# Patient Record
Sex: FEMALE | Race: WHITE | URBAN - NONMETROPOLITAN AREA
[De-identification: names, ages, dates, MRNs, and addresses within clinical notes are randomized per-mention and may not be internally consistent; named-entity substitution may affect disease eponyms.]

---

## 2016-12-05 LAB
CHOLESTEROL, TOTAL: 193 MG/DL
CHOLESTEROL/HDL RATIO: 3.7
HDLC SERPL-MCNC: 52 MG/DL (ref 35–70)
LDL CHOLESTEROL CALCULATED: 113.8 MG/DL (ref 0–160)
TRIGL SERPL-MCNC: 136 MG/DL
VLDLC SERPL CALC-MCNC: 27 MG/DL

## 2017-06-16 DIAGNOSIS — R73.01 IFG (IMPAIRED FASTING GLUCOSE): ICD-10-CM

## 2017-06-16 DIAGNOSIS — I67.1 CEREBRAL ANEURYSM: ICD-10-CM

## 2017-06-16 DIAGNOSIS — E78.49 OTHER HYPERLIPIDEMIA: ICD-10-CM

## 2017-06-16 DIAGNOSIS — I42.8 NON-ISCHEMIC CARDIOMYOPATHY (HCC): ICD-10-CM

## 2017-06-16 DIAGNOSIS — D12.6 BENIGN NEOPLASM OF COLON, UNSPECIFIED: ICD-10-CM

## 2017-06-16 PROBLEM — E78.5 HYPERLIPIDEMIA: Status: ACTIVE | Noted: 2017-06-16

## 2017-06-16 PROBLEM — H33.20 RETINAL DETACHMENT: Status: ACTIVE | Noted: 2017-06-16

## 2017-06-16 PROBLEM — K21.9 GERD (GASTROESOPHAGEAL REFLUX DISEASE): Status: ACTIVE | Noted: 2017-06-16

## 2017-06-16 PROBLEM — H69.90 EUSTACHIAN TUBE DISORDER: Status: ACTIVE | Noted: 2017-06-16

## 2017-06-16 RX ORDER — ATORVASTATIN CALCIUM 20 MG/1
20 TABLET, FILM COATED ORAL DAILY
COMMUNITY
End: 2017-06-22 | Stop reason: SDUPTHER

## 2017-06-16 RX ORDER — ASPIRIN 325 MG
325 TABLET, DELAYED RELEASE (ENTERIC COATED) ORAL DAILY
COMMUNITY

## 2017-06-16 RX ORDER — GINKGO BILOBA 60 MG
TABLET ORAL
COMMUNITY
End: 2020-06-05

## 2017-06-16 RX ORDER — FLUTICASONE PROPIONATE 50 MCG
1 SPRAY, SUSPENSION (ML) NASAL DAILY
COMMUNITY
End: 2018-07-11 | Stop reason: SDUPTHER

## 2017-06-16 RX ORDER — CETIRIZINE HYDROCHLORIDE 10 MG/1
10 TABLET ORAL DAILY
COMMUNITY
End: 2019-10-14

## 2017-06-16 RX ORDER — M-VIT,TX,IRON,MINS/CALC/FOLIC 27MG-0.4MG
1 TABLET ORAL DAILY
COMMUNITY

## 2017-06-16 RX ORDER — PANTOPRAZOLE SODIUM 40 MG/1
40 TABLET, DELAYED RELEASE ORAL DAILY
COMMUNITY
End: 2017-06-22 | Stop reason: SDUPTHER

## 2017-06-22 ENCOUNTER — OFFICE VISIT (OUTPATIENT)
Dept: FAMILY MEDICINE CLINIC | Age: 62
End: 2017-06-22
Payer: COMMERCIAL

## 2017-06-22 VITALS
HEIGHT: 68 IN | BODY MASS INDEX: 25.61 KG/M2 | WEIGHT: 169 LBS | HEART RATE: 62 BPM | DIASTOLIC BLOOD PRESSURE: 80 MMHG | SYSTOLIC BLOOD PRESSURE: 130 MMHG

## 2017-06-22 DIAGNOSIS — E78.2 MIXED HYPERLIPIDEMIA: ICD-10-CM

## 2017-06-22 DIAGNOSIS — Z23 NEED FOR PROPHYLACTIC VACCINATION AND INOCULATION AGAINST VARICELLA: ICD-10-CM

## 2017-06-22 DIAGNOSIS — F41.8 DEPRESSION WITH ANXIETY: ICD-10-CM

## 2017-06-22 DIAGNOSIS — K21.9 GASTROESOPHAGEAL REFLUX DISEASE WITHOUT ESOPHAGITIS: ICD-10-CM

## 2017-06-22 DIAGNOSIS — Z11.59 NEED FOR HEPATITIS C SCREENING TEST: Primary | ICD-10-CM

## 2017-06-22 DIAGNOSIS — R73.01 IFG (IMPAIRED FASTING GLUCOSE): ICD-10-CM

## 2017-06-22 PROCEDURE — 99214 OFFICE O/P EST MOD 30 MIN: CPT | Performed by: FAMILY MEDICINE

## 2017-06-22 RX ORDER — ESCITALOPRAM OXALATE 10 MG/1
10 TABLET ORAL DAILY
Qty: 30 TABLET | Refills: 5 | Status: SHIPPED | OUTPATIENT
Start: 2017-06-22 | End: 2017-12-08 | Stop reason: SDUPTHER

## 2017-06-22 RX ORDER — ATORVASTATIN CALCIUM 20 MG/1
20 TABLET, FILM COATED ORAL DAILY
Qty: 30 TABLET | Refills: 5 | Status: SHIPPED | OUTPATIENT
Start: 2017-06-22 | End: 2018-01-09 | Stop reason: SDUPTHER

## 2017-06-22 RX ORDER — PANTOPRAZOLE SODIUM 40 MG/1
40 TABLET, DELAYED RELEASE ORAL DAILY
Qty: 30 TABLET | Refills: 5 | Status: SHIPPED | OUTPATIENT
Start: 2017-06-22 | End: 2018-01-09 | Stop reason: SDUPTHER

## 2017-06-22 ASSESSMENT — ENCOUNTER SYMPTOMS
WHEEZING: 0
COUGH: 0
DOUBLE VISION: 0
BLURRED VISION: 0
HEARTBURN: 0
SHORTNESS OF BREATH: 0
ABDOMINAL PAIN: 0

## 2017-06-22 ASSESSMENT — PATIENT HEALTH QUESTIONNAIRE - PHQ9
SUM OF ALL RESPONSES TO PHQ9 QUESTIONS 1 & 2: 2
2. FEELING DOWN, DEPRESSED OR HOPELESS: 1
1. LITTLE INTEREST OR PLEASURE IN DOING THINGS: 1
SUM OF ALL RESPONSES TO PHQ QUESTIONS 1-9: 2

## 2017-08-24 ENCOUNTER — OFFICE VISIT (OUTPATIENT)
Dept: FAMILY MEDICINE CLINIC | Age: 62
End: 2017-08-24
Payer: COMMERCIAL

## 2017-08-24 VITALS
SYSTOLIC BLOOD PRESSURE: 118 MMHG | BODY MASS INDEX: 26.23 KG/M2 | HEART RATE: 72 BPM | DIASTOLIC BLOOD PRESSURE: 60 MMHG | WEIGHT: 172.5 LBS

## 2017-08-24 DIAGNOSIS — F41.8 DEPRESSION WITH ANXIETY: Primary | ICD-10-CM

## 2017-08-24 PROCEDURE — 99214 OFFICE O/P EST MOD 30 MIN: CPT | Performed by: FAMILY MEDICINE

## 2017-12-08 DIAGNOSIS — F41.8 DEPRESSION WITH ANXIETY: ICD-10-CM

## 2017-12-10 RX ORDER — ESCITALOPRAM OXALATE 10 MG/1
10 TABLET ORAL DAILY
Qty: 30 TABLET | Refills: 5 | Status: SHIPPED | OUTPATIENT
Start: 2017-12-10 | End: 2018-03-12 | Stop reason: SDUPTHER

## 2018-01-02 LAB
AGE FOR GFR: 61
ALT SERPL-CCNC: 48 UNITS/L
ANION GAP SERPL CALCULATED.3IONS-SCNC: 16 MMOL/L
AST SERPL-CCNC: 26 UNITS/L
BUN BLDV-MCNC: 17 MG/DL
CALCIUM SERPL-MCNC: 9.4 MG/DL
CHLORIDE BLD-SCNC: 104 MMOL/L
CHOLESTEROL/HDL RATIO: 3.6 RATIO
CHOLESTEROL: 204 MG/DL
CO2: 28 MMOL/L
CREAT SERPL-MCNC: 0.8 MG/DL
EGFR BF: 88 ML/MIN/1.73 M2
EGFR BM: 119 ML/MIN/1.73 M2
EGFR WF: 73 ML/MIN/1.73 M2
EGFR WM: 98 ML/MIN/1.73 M2
GLUCOSE: 101 MG/DL
HDL, DIRECT: 56 MG/DL
HEPATITIS C IGG: NORMAL
LDL CHOLESTEROL CALCULATED: 130.2 MG/DL
POTASSIUM SERPL-SCNC: 4.5 MMOL/L
SIGNAL/CUTOFF: NORMAL
SODIUM BLD-SCNC: 143 MMOL/L
TRIGL SERPL-MCNC: 89 MG/DL
VLDLC SERPL CALC-MCNC: 18 MG/DL

## 2018-01-09 LAB — GLUCOSE FASTING: 101 MG/DL

## 2018-01-09 NOTE — TELEPHONE ENCOUNTER
Kev Milligan is calling to request a refill on the following medication(s):  Requested Prescriptions     Pending Prescriptions Disp Refills    atorvastatin (LIPITOR) 20 MG tablet [Pharmacy Med Name: ATORVASTATIN 20 MG TABLET] 30 tablet 5     Sig: take 1 tablet by mouth once daily    pantoprazole (PROTONIX) 40 MG tablet [Pharmacy Med Name: PANTOPRAZOLE SOD DR 40 MG TAB] 30 tablet 5     Sig: take 1 tablet by mouth once daily       Last Visit Date (If Applicable):  5/71/7091    Next Visit Date:    1/10/2018

## 2018-01-10 ENCOUNTER — OFFICE VISIT (OUTPATIENT)
Dept: FAMILY MEDICINE CLINIC | Age: 63
End: 2018-01-10
Payer: COMMERCIAL

## 2018-01-10 VITALS
DIASTOLIC BLOOD PRESSURE: 80 MMHG | WEIGHT: 187 LBS | BODY MASS INDEX: 28.43 KG/M2 | SYSTOLIC BLOOD PRESSURE: 120 MMHG | HEART RATE: 68 BPM

## 2018-01-10 DIAGNOSIS — K21.9 GASTROESOPHAGEAL REFLUX DISEASE WITHOUT ESOPHAGITIS: ICD-10-CM

## 2018-01-10 DIAGNOSIS — Z86.010 HISTORY OF COLON POLYPS: ICD-10-CM

## 2018-01-10 DIAGNOSIS — E78.2 MIXED HYPERLIPIDEMIA: ICD-10-CM

## 2018-01-10 DIAGNOSIS — F41.8 DEPRESSION WITH ANXIETY: Primary | ICD-10-CM

## 2018-01-10 PROCEDURE — 99214 OFFICE O/P EST MOD 30 MIN: CPT | Performed by: FAMILY MEDICINE

## 2018-01-10 RX ORDER — ATORVASTATIN CALCIUM 20 MG/1
TABLET, FILM COATED ORAL
Qty: 30 TABLET | Refills: 5 | Status: SHIPPED | OUTPATIENT
Start: 2018-01-10 | End: 2018-03-12 | Stop reason: SDUPTHER

## 2018-01-10 RX ORDER — PANTOPRAZOLE SODIUM 40 MG/1
TABLET, DELAYED RELEASE ORAL
Qty: 30 TABLET | Refills: 5 | Status: SHIPPED | OUTPATIENT
Start: 2018-01-10 | End: 2018-03-12 | Stop reason: SDUPTHER

## 2018-01-10 ASSESSMENT — ENCOUNTER SYMPTOMS
SHORTNESS OF BREATH: 0
HEARTBURN: 0

## 2018-01-10 NOTE — PROGRESS NOTES
Completed    Pneumococcal med risk  Completed    Hepatitis C screen  Completed       Subjective:      Review of Systems   Respiratory: Negative for shortness of breath. Cardiovascular: Negative for chest pain. Musculoskeletal: Negative for myalgias. Neurological: Negative for focal weakness. Constitutional: Negative for malaise/fatigue. Respiratory: Negative for shortness of breath. Cardiovascular: Negative for chest pain. Gastrointestinal: Negative for heartburn. Musculoskeletal: Negative for myalgias. Psychiatric/Behavioral: Negative for depression (mood has been ok). The patient is not nervous/anxious and does not have insomnia. Objective:     /80   Pulse 68   Wt 187 lb (84.8 kg)   BMI 28.43 kg/m²     Physical Exam   Constitutional: She is oriented to person, place, and time. She appears well-developed and well-nourished. HENT:   Head: Normocephalic and atraumatic. Eyes: Conjunctivae and EOM are normal.   Neck: Normal range of motion. Neck supple. No JVD present. No thyromegaly present. Cardiovascular: Normal rate, regular rhythm and intact distal pulses. Exam reveals no gallop and no friction rub. No murmur heard. Pulmonary/Chest: Effort normal and breath sounds normal. No respiratory distress. Abdominal: Soft. Musculoskeletal: She exhibits no edema. Lymphadenopathy:     She has no cervical adenopathy. Neurological: She is alert and oriented to person, place, and time. Skin: Skin is warm. Psychiatric: She has a normal mood and affect. Her behavior is normal. Judgment and thought content normal.   Nursing note and vitals reviewed. Assessment/Plan:     1. Depression with anxiety  Well cotnrolled. Continue on the current medications   2. History of colon polyps  Due for the colonoscopy- schedule this summer   3. Mixed hyperlipidemia  Reviewed diet deniz with the glucose of 101. No change in meds at this time   4.  Gastroesophageal reflux disease without

## 2018-03-12 DIAGNOSIS — F41.8 DEPRESSION WITH ANXIETY: ICD-10-CM

## 2018-03-12 RX ORDER — ESCITALOPRAM OXALATE 10 MG/1
10 TABLET ORAL DAILY
Qty: 90 TABLET | Refills: 3 | Status: SHIPPED | OUTPATIENT
Start: 2018-03-12 | End: 2019-01-09 | Stop reason: SDUPTHER

## 2018-03-12 RX ORDER — PANTOPRAZOLE SODIUM 40 MG/1
40 TABLET, DELAYED RELEASE ORAL DAILY
Qty: 90 TABLET | Refills: 3 | Status: SHIPPED | OUTPATIENT
Start: 2018-03-12 | End: 2018-07-11 | Stop reason: SDUPTHER

## 2018-03-12 RX ORDER — ATORVASTATIN CALCIUM 20 MG/1
20 TABLET, FILM COATED ORAL DAILY
Qty: 90 TABLET | Refills: 3 | Status: SHIPPED | OUTPATIENT
Start: 2018-03-12 | End: 2018-07-11 | Stop reason: SDUPTHER

## 2018-03-12 NOTE — TELEPHONE ENCOUNTER
Requesting a 3 month supply instead of a 1 month     Monique Lucero is calling to request a refill on the following medication(s):  Requested Prescriptions     Pending Prescriptions Disp Refills    escitalopram (LEXAPRO) 10 MG tablet 90 tablet 3     Sig: Take 1 tablet by mouth daily    atorvastatin (LIPITOR) 20 MG tablet 90 tablet 3     Sig: Take 1 tablet by mouth daily    pantoprazole (PROTONIX) 40 MG tablet 90 tablet 3     Sig: Take 1 tablet by mouth daily       Last Visit Date (If Applicable):  3/17/9634    Next Visit Date:    7/11/2018

## 2018-07-11 ENCOUNTER — OFFICE VISIT (OUTPATIENT)
Dept: FAMILY MEDICINE CLINIC | Age: 63
End: 2018-07-11
Payer: COMMERCIAL

## 2018-07-11 VITALS
BODY MASS INDEX: 27.07 KG/M2 | HEART RATE: 60 BPM | WEIGHT: 178.06 LBS | DIASTOLIC BLOOD PRESSURE: 78 MMHG | SYSTOLIC BLOOD PRESSURE: 124 MMHG | OXYGEN SATURATION: 95 %

## 2018-07-11 DIAGNOSIS — J30.89 CHRONIC NONSEASONAL ALLERGIC RHINITIS DUE TO POLLEN: ICD-10-CM

## 2018-07-11 DIAGNOSIS — F33.42 RECURRENT MAJOR DEPRESSIVE DISORDER, IN FULL REMISSION (HCC): ICD-10-CM

## 2018-07-11 DIAGNOSIS — Z12.39 SCREENING BREAST EXAMINATION: ICD-10-CM

## 2018-07-11 DIAGNOSIS — Z12.11 ENCOUNTER FOR SCREENING COLONOSCOPY: ICD-10-CM

## 2018-07-11 DIAGNOSIS — E78.2 MIXED HYPERLIPIDEMIA: Primary | ICD-10-CM

## 2018-07-11 DIAGNOSIS — K21.9 GASTROESOPHAGEAL REFLUX DISEASE WITHOUT ESOPHAGITIS: ICD-10-CM

## 2018-07-11 DIAGNOSIS — R73.01 IMPAIRED FASTING GLUCOSE: ICD-10-CM

## 2018-07-11 PROCEDURE — 99214 OFFICE O/P EST MOD 30 MIN: CPT | Performed by: FAMILY MEDICINE

## 2018-07-11 RX ORDER — ATORVASTATIN CALCIUM 20 MG/1
20 TABLET, FILM COATED ORAL DAILY
Qty: 90 TABLET | Refills: 3 | Status: SHIPPED | OUTPATIENT
Start: 2018-07-11 | End: 2019-03-13

## 2018-07-11 RX ORDER — FLUTICASONE PROPIONATE 50 MCG
1 SPRAY, SUSPENSION (ML) NASAL DAILY
Qty: 3 BOTTLE | Refills: 3 | Status: SHIPPED | OUTPATIENT
Start: 2018-07-11 | End: 2019-10-14 | Stop reason: SDUPTHER

## 2018-07-11 RX ORDER — PANTOPRAZOLE SODIUM 40 MG/1
40 TABLET, DELAYED RELEASE ORAL DAILY
Qty: 90 TABLET | Refills: 3 | Status: SHIPPED | OUTPATIENT
Start: 2018-07-11 | End: 2018-07-11 | Stop reason: SDUPTHER

## 2018-07-11 ASSESSMENT — ENCOUNTER SYMPTOMS
BLOOD IN STOOL: 0
WHEEZING: 0
ABDOMINAL PAIN: 0
COUGH: 0
SHORTNESS OF BREATH: 0
CONSTIPATION: 0
DIARRHEA: 0

## 2018-07-11 ASSESSMENT — PATIENT HEALTH QUESTIONNAIRE - PHQ9
SUM OF ALL RESPONSES TO PHQ QUESTIONS 1-9: 0
1. LITTLE INTEREST OR PLEASURE IN DOING THINGS: 0
SUM OF ALL RESPONSES TO PHQ9 QUESTIONS 1 & 2: 0
2. FEELING DOWN, DEPRESSED OR HOPELESS: 0

## 2018-07-11 NOTE — PROGRESS NOTES
Review of Systems   Respiratory: Negative for cough, shortness of breath and wheezing. Cardiovascular: Negative for chest pain, palpitations and leg swelling. Gastrointestinal: Negative for abdominal pain, constipation and diarrhea. Genitourinary: Negative for frequency and urgency. Musculoskeletal: Negative for joint pain and myalgias. Neurological: Negative for dizziness and headaches. Psychiatric/Behavioral: Negative for depression. The patient is not nervous/anxious. Insurance will only cover protonix 3 months out of the year. Is there something else that I could be using.

## 2018-07-11 NOTE — PROGRESS NOTES
Laterality Date    COLONOSCOPY  08/2008    mynor many small polyps due 2018    SKIN CANCER EXCISION  04/2014    TONSILLECTOMY         Family History   Problem Relation Age of Onset    High Blood Pressure Mother     Heart Disease Mother     Diabetes Mother     Heart Surgery Father     Heart Disease Father        Social History   Substance Use Topics    Smoking status: Former Smoker    Smokeless tobacco: Never Used    Alcohol use Yes      Current Outpatient Prescriptions   Medication Sig Dispense Refill    fluticasone (FLONASE) 50 MCG/ACT nasal spray 1 spray by Nasal route daily 3 Bottle 3    atorvastatin (LIPITOR) 20 MG tablet Take 1 tablet by mouth daily 90 tablet 3    pantoprazole (PROTONIX) 40 MG tablet Take 1 tablet by mouth daily 90 tablet 3    escitalopram (LEXAPRO) 10 MG tablet Take 1 tablet by mouth daily 90 tablet 3    Cyanocobalamin (VITAMIN B 12 PO) Take by mouth      Multiple Vitamins-Minerals (THERAPEUTIC MULTIVITAMIN-MINERALS) tablet Take 1 tablet by mouth daily      Ginkgo 60 MG TABS Take by mouth      cetirizine (ZYRTEC) 10 MG tablet Take 10 mg by mouth daily      Calcium Carbonate-Vitamin D (CALCIUM-VITAMIN D) 500-200 MG-UNIT per tablet Take 1 tablet by mouth 2 times daily (with meals)      aspirin 325 MG EC tablet Take 325 mg by mouth every 6 hours as needed for Pain       No current facility-administered medications for this visit.       Allergies   Allergen Reactions    Bupropion Other (See Comments)     irritable    Chantix [Varenicline]      Irritable         Health Maintenance   Topic Date Due    A1C test (Diabetic or Prediabetic)  02/05/1966    HIV screen  02/05/1971    Shingles Vaccine (1 of 2 - 2 Dose Series) 02/05/2006    DTaP/Tdap/Td vaccine (2 - Tdap) 08/07/2018    Colon cancer screen colonoscopy  08/18/2018    Flu vaccine (1) 09/01/2018    Breast cancer screen  03/01/2019    Cervical cancer screen  12/23/2019    Lipid screen  01/02/2023    Pneumococcal med risk  Completed    Hepatitis C screen  Completed       Subjective:      Review of Systems   Respiratory: Negative for cough and shortness of breath. Cardiovascular: Negative for chest pain and leg swelling. Gastrointestinal: Negative for abdominal pain, blood in stool, constipation and diarrhea. Musculoskeletal: Negative for myalgias. Neurological: Negative for focal weakness. Respiratory: Negative for cough, shortness of breath and wheezing. Cardiovascular: Negative for chest pain, palpitations and leg swelling. Gastrointestinal: Negative for abdominal pain, constipation and diarrhea. Genitourinary: Negative for frequency and urgency. Musculoskeletal: Negative for joint pain and myalgias. Neurological: Negative for dizziness and headaches. Psychiatric/Behavioral: Negative for depression. The patient is not nervous/anxious. Objective:     /78   Pulse 60   Wt 178 lb 1 oz (80.8 kg)   SpO2 95%   BMI 27.07 kg/m²     Physical Exam   Constitutional: She is oriented to person, place, and time. She appears well-developed and well-nourished. HENT:   Head: Normocephalic and atraumatic. Eyes: Conjunctivae and EOM are normal.   Neck: Normal range of motion. Neck supple. No JVD present. No thyromegaly present. Cardiovascular: Normal rate, regular rhythm and intact distal pulses. Exam reveals no gallop and no friction rub. No murmur heard. Pulmonary/Chest: Effort normal and breath sounds normal. No respiratory distress. Abdominal: Soft. She exhibits no distension and no mass. There is no tenderness. There is no rebound and no guarding. Musculoskeletal: She exhibits no edema. Lymphadenopathy:     She has no cervical adenopathy. Neurological: She is alert and oriented to person, place, and time. Skin: Skin is warm. Psychiatric: She has a normal mood and affect. Her behavior is normal. Judgment normal.   Nursing note and vitals reviewed.       Assessment/Plan:

## 2018-07-12 RX ORDER — PANTOPRAZOLE SODIUM 40 MG/1
40 TABLET, DELAYED RELEASE ORAL DAILY
Qty: 90 TABLET | Refills: 3 | Status: SHIPPED | OUTPATIENT
Start: 2018-07-12 | End: 2019-01-09 | Stop reason: SDUPTHER

## 2018-08-01 LAB — SURGICAL PATHOLOGY REPORT: NORMAL

## 2018-12-17 LAB
AGE FOR GFR: 62
ALT SERPL-CCNC: 32 UNITS/L
ANION GAP SERPL CALCULATED.3IONS-SCNC: 11 MMOL/L
AST SERPL-CCNC: 27 UNITS/L
BUN BLDV-MCNC: 18 MG/DL
CALCIUM SERPL-MCNC: 9.4 MG/DL
CHLORIDE BLD-SCNC: 106 MMOL/L
CHOLESTEROL/HDL RATIO: 4.8 RATIO
CHOLESTEROL: 232 MG/DL
CO2: 29 MMOL/L
CREAT SERPL-MCNC: 0.9 MG/DL
EGFR BF: 77 ML/MIN/1.73 M2
EGFR BM: 104 ML/MIN/1.73 M2
EGFR WF: 63 ML/MIN/1.73 M2
EGFR WM: 86 ML/MIN/1.73 M2
GLUCOSE: 104 MG/DL
HDL, DIRECT: 48 MG/DL
LDL CHOLESTEROL CALCULATED: 165.8 MG/DL
POTASSIUM SERPL-SCNC: 4.5 MMOL/L
SODIUM BLD-SCNC: 141 MMOL/L
TRIGL SERPL-MCNC: 91 MG/DL
VLDLC SERPL CALC-MCNC: 18 MG/DL

## 2019-01-09 ENCOUNTER — OFFICE VISIT (OUTPATIENT)
Dept: FAMILY MEDICINE CLINIC | Age: 64
End: 2019-01-09
Payer: COMMERCIAL

## 2019-01-09 VITALS
OXYGEN SATURATION: 94 % | DIASTOLIC BLOOD PRESSURE: 74 MMHG | SYSTOLIC BLOOD PRESSURE: 126 MMHG | HEART RATE: 68 BPM | BODY MASS INDEX: 28.02 KG/M2 | TEMPERATURE: 98 F | WEIGHT: 184.31 LBS

## 2019-01-09 DIAGNOSIS — Z23 NEED FOR INFLUENZA VACCINATION: ICD-10-CM

## 2019-01-09 DIAGNOSIS — I42.8 NON-ISCHEMIC CARDIOMYOPATHY (HCC): ICD-10-CM

## 2019-01-09 DIAGNOSIS — F41.8 DEPRESSION WITH ANXIETY: ICD-10-CM

## 2019-01-09 DIAGNOSIS — R63.5 WEIGHT GAIN: ICD-10-CM

## 2019-01-09 DIAGNOSIS — F33.42 RECURRENT MAJOR DEPRESSIVE DISORDER, IN FULL REMISSION (HCC): ICD-10-CM

## 2019-01-09 DIAGNOSIS — E78.2 MIXED HYPERLIPIDEMIA: ICD-10-CM

## 2019-01-09 DIAGNOSIS — Z83.49 FAMILY HISTORY OF THYROID DISEASE IN MOTHER: ICD-10-CM

## 2019-01-09 DIAGNOSIS — R73.01 IFG (IMPAIRED FASTING GLUCOSE): ICD-10-CM

## 2019-01-09 DIAGNOSIS — K21.9 GASTROESOPHAGEAL REFLUX DISEASE WITHOUT ESOPHAGITIS: ICD-10-CM

## 2019-01-09 LAB — TSH REFLEX FT4: 2.48 MIU/ML

## 2019-01-09 PROCEDURE — 90471 IMMUNIZATION ADMIN: CPT | Performed by: FAMILY MEDICINE

## 2019-01-09 PROCEDURE — 99214 OFFICE O/P EST MOD 30 MIN: CPT | Performed by: FAMILY MEDICINE

## 2019-01-09 PROCEDURE — 90686 IIV4 VACC NO PRSV 0.5 ML IM: CPT | Performed by: FAMILY MEDICINE

## 2019-01-09 RX ORDER — PANTOPRAZOLE SODIUM 40 MG/1
40 TABLET, DELAYED RELEASE ORAL DAILY
Qty: 90 TABLET | Refills: 3 | Status: SHIPPED | OUTPATIENT
Start: 2019-01-09 | End: 2020-03-04

## 2019-01-09 RX ORDER — ESCITALOPRAM OXALATE 10 MG/1
10 TABLET ORAL DAILY
Qty: 90 TABLET | Refills: 3 | Status: SHIPPED | OUTPATIENT
Start: 2019-01-09 | End: 2020-03-03

## 2019-01-09 ASSESSMENT — ENCOUNTER SYMPTOMS
COUGH: 0
DIARRHEA: 0
SHORTNESS OF BREATH: 0
WHEEZING: 0
ABDOMINAL PAIN: 0
CONSTIPATION: 0

## 2019-03-13 RX ORDER — ATORVASTATIN CALCIUM 20 MG/1
TABLET, FILM COATED ORAL
Qty: 90 TABLET | Refills: 3 | Status: SHIPPED | OUTPATIENT
Start: 2019-03-13 | End: 2020-03-03

## 2019-07-08 ENCOUNTER — TELEPHONE (OUTPATIENT)
Dept: FAMILY MEDICINE CLINIC | Age: 64
End: 2019-07-08

## 2019-07-08 ENCOUNTER — OFFICE VISIT (OUTPATIENT)
Dept: FAMILY MEDICINE CLINIC | Age: 64
End: 2019-07-08
Payer: COMMERCIAL

## 2019-07-08 VITALS
DIASTOLIC BLOOD PRESSURE: 84 MMHG | HEART RATE: 60 BPM | WEIGHT: 181.4 LBS | BODY MASS INDEX: 27.58 KG/M2 | SYSTOLIC BLOOD PRESSURE: 128 MMHG | OXYGEN SATURATION: 95 %

## 2019-07-08 DIAGNOSIS — Z83.49 FAMILY HISTORY OF THYROID DISEASE IN MOTHER: ICD-10-CM

## 2019-07-08 DIAGNOSIS — Z12.39 BREAST SCREENING, UNSPECIFIED: ICD-10-CM

## 2019-07-08 DIAGNOSIS — I10 ESSENTIAL HYPERTENSION: Primary | ICD-10-CM

## 2019-07-08 DIAGNOSIS — Z12.39 SCREENING BREAST EXAMINATION: ICD-10-CM

## 2019-07-08 DIAGNOSIS — Z86.79 HISTORY OF NONTRAUMATIC RUPTURE OF CEREBRAL ANEURYSM: Primary | ICD-10-CM

## 2019-07-08 DIAGNOSIS — Z86.79 HISTORY OF NONTRAUMATIC RUPTURE OF CEREBRAL ANEURYSM: ICD-10-CM

## 2019-07-08 DIAGNOSIS — E78.2 MIXED HYPERLIPIDEMIA: ICD-10-CM

## 2019-07-08 DIAGNOSIS — F33.42 RECURRENT MAJOR DEPRESSIVE DISORDER, IN FULL REMISSION (HCC): ICD-10-CM

## 2019-07-08 DIAGNOSIS — F41.8 DEPRESSION WITH ANXIETY: ICD-10-CM

## 2019-07-08 PROCEDURE — 99214 OFFICE O/P EST MOD 30 MIN: CPT | Performed by: FAMILY MEDICINE

## 2019-07-08 RX ORDER — AMLODIPINE BESYLATE 2.5 MG/1
2.5 TABLET ORAL DAILY
Qty: 90 TABLET | Refills: 1 | Status: SHIPPED | OUTPATIENT
Start: 2019-07-08 | End: 2019-12-08 | Stop reason: SDUPTHER

## 2019-07-08 ASSESSMENT — ENCOUNTER SYMPTOMS
COUGH: 0
DIARRHEA: 0
ABDOMINAL PAIN: 0
SHORTNESS OF BREATH: 0
WHEEZING: 0
CONSTIPATION: 0

## 2019-07-08 NOTE — PROGRESS NOTES
Review of Systems   Respiratory: Negative for cough, shortness of breath and wheezing. Cardiovascular: Negative for chest pain, palpitations and leg swelling. Gastrointestinal: Negative for abdominal pain, constipation and diarrhea. Genitourinary: Negative for frequency and urgency. Neurological: Negative for dizziness and headaches. States my BP has been high. Jumping all over. Denies any symptoms when BP is elevated.
aneurysms we will go ahead and start with the low-dose of amlodipine 2.5 mg daily if blood pressure not well controlled would bump that up to 5 mg daily pressures at home range anywhere from the 443J to 783 systolic   2. Family history of thyroid disease in mother     1. Recurrent major depressive disorder, in full remission [facility])   doing well on her current medication. No change at this time. 4. Depression with anxiety     5. History of nontraumatic rupture of cerebral aneurysm  MRI BRAIN W CONTRAST-the rapid fluctuations in the blood pressure without clear etiology and just not feeling right will go ahead and check the MRI in light of her history of multiple aneurysms and potential for recurrence    MRA HEAD W CONTRAST   6. Breast screening, unspecified     7. Screening breast examination  JENNIFER DIGITAL SCREEN W CAD BILATERAL   8. Mixed hyperlipidemia   well-controlled at last visit continue on her current medicines asymptomatic recheck in December         Lab Results   Component Value Date    CHOL 232 (H) 12/17/2018    TRIG 91 12/17/2018    HDL 52 12/05/2016    ALT 32 12/17/2018    AST 27 12/17/2018     12/17/2018    K 4.5 12/17/2018     12/17/2018    CREATININE 0.9 12/17/2018    BUN 18 (H) 12/17/2018    CO2 29 12/17/2018    GLUF 101 01/09/2018       Return in about 2 months (around 9/8/2019) for HTN, Medication recheck. Patient given educational materials - see patientinstructions. Discussed use, benefit, and side effects of prescribed medications. All patient questions answered. Pt voiced understanding. Reviewed health maintenance. Instructed to continue current medications, diet andexercise. Patient agreed with treatment plan. Follow up as directed.      Electronically signed by Chelsea Ford MD on 7/9/2019

## 2019-07-29 LAB
AGE FOR GFR: 63
ANION GAP SERPL CALCULATED.3IONS-SCNC: 11 MMOL/L
BASOPHILS # BLD: 0.07 THOU/MM3 (ref 0–0.3)
BUN BLDV-MCNC: 28 MG/DL (ref 7–17)
CALCIUM SERPL-MCNC: 9.8 MG/DL (ref 8.4–10.2)
CHLORIDE BLD-SCNC: 103 MMOL/L (ref 98–120)
CO2: 28 MMOL/L (ref 22–31)
CREAT SERPL-MCNC: 0.8 MG/DL (ref 0.5–1)
DIFFERENTIAL: AUTOMATED DIFF
EGFR BF: 88 ML/MIN/1.73 M2
EGFR BM: 118 ML/MIN/1.73 M2
EGFR WF: 72 ML/MIN/1.73 M2
EGFR WM: 98 ML/MIN/1.73 M2
EOSINOPHIL # BLD: 0.15 THOU/MM3 (ref 0–1.1)
GLUCOSE: 95 MG/DL (ref 65–105)
HCT VFR BLD CALC: 38.2 % (ref 37–47)
HEMOGLOBIN: 12.8 G/DL (ref 12–16)
LYMPHOCYTES # BLD: 1.63 THOU/MM3 (ref 1–5.5)
MCH RBC QN AUTO: 31.5 PG (ref 28.5–32)
MCHC RBC AUTO-ENTMCNC: 33.4 G/DL (ref 32–37)
MCV RBC AUTO: 94.1 FL (ref 80–94)
MONOCYTES # BLD: 0.43 THOU/MM3 (ref 0.1–1)
NEUTROPHILS: 3.15 THOU/MM3 (ref 2–8.1)
PDW BLD-RTO: 10.9 % (ref 8.5–15.5)
PLATELET # BLD: 235 THOU/MM3 (ref 130–400)
PMV BLD AUTO: 6.5 FL (ref 7.4–11)
POTASSIUM SERPL-SCNC: 4.2 MMOL/L (ref 3.6–5)
RBC # BLD: 4.06 M/UL (ref 4.2–5.4)
SODIUM BLD-SCNC: 138 MMOL/L (ref 135–145)
WBC # BLD: 5.42 THOU/ML3 (ref 4.8–10)

## 2019-08-09 ENCOUNTER — TELEPHONE (OUTPATIENT)
Dept: FAMILY MEDICINE CLINIC | Age: 64
End: 2019-08-09

## 2019-08-09 DIAGNOSIS — Z86.79 HISTORY OF NONTRAUMATIC RUPTURE OF CEREBRAL ANEURYSM: Primary | ICD-10-CM

## 2019-08-10 NOTE — TELEPHONE ENCOUNTER
I am not sure either. Best option may be to return to neurology for an evaluation to see if they recommend observation vs. Angiogram vs a CTA.   Does she want to go back to [facility] or Dr. Avel Liu at [facility]. V's who is an interventional radiologist?

## 2019-08-14 NOTE — TELEPHONE ENCOUNTER
Spoke with Karan Gallego her at [facility] stated that Dr. Reginald Mason did not want to do it (CTA) here due to the reading being do in depth, advised that the type of coil/stent that was placed be found out because most of them are MRA safe plus it would just benefit her to know for further health management    Could be done at [facility] or even [facility]

## 2019-08-14 NOTE — TELEPHONE ENCOUNTER
Please call Timo Martinez and see if we can get the information-- let them know what we are needing to know.

## 2019-10-14 ENCOUNTER — OFFICE VISIT (OUTPATIENT)
Dept: FAMILY MEDICINE CLINIC | Age: 64
End: 2019-10-14
Payer: COMMERCIAL

## 2019-10-14 VITALS
BODY MASS INDEX: 28.74 KG/M2 | WEIGHT: 189 LBS | HEART RATE: 76 BPM | OXYGEN SATURATION: 97 % | SYSTOLIC BLOOD PRESSURE: 128 MMHG | DIASTOLIC BLOOD PRESSURE: 82 MMHG

## 2019-10-14 DIAGNOSIS — E78.2 MIXED HYPERLIPIDEMIA: ICD-10-CM

## 2019-10-14 DIAGNOSIS — I10 ESSENTIAL HYPERTENSION: ICD-10-CM

## 2019-10-14 DIAGNOSIS — J30.89 CHRONIC NONSEASONAL ALLERGIC RHINITIS DUE TO POLLEN: ICD-10-CM

## 2019-10-14 DIAGNOSIS — Z23 NEED FOR INFLUENZA VACCINATION: ICD-10-CM

## 2019-10-14 DIAGNOSIS — Z86.79 HISTORY OF NONTRAUMATIC RUPTURE OF CEREBRAL ANEURYSM: ICD-10-CM

## 2019-10-14 PROCEDURE — 90686 IIV4 VACC NO PRSV 0.5 ML IM: CPT | Performed by: FAMILY MEDICINE

## 2019-10-14 PROCEDURE — 90471 IMMUNIZATION ADMIN: CPT | Performed by: FAMILY MEDICINE

## 2019-10-14 PROCEDURE — 99214 OFFICE O/P EST MOD 30 MIN: CPT | Performed by: FAMILY MEDICINE

## 2019-10-14 RX ORDER — FLUTICASONE PROPIONATE 50 MCG
1 SPRAY, SUSPENSION (ML) NASAL DAILY
Qty: 3 BOTTLE | Refills: 3 | Status: SHIPPED | OUTPATIENT
Start: 2019-10-14

## 2019-10-14 RX ORDER — TETRACYCLINE HCL 500 MG
CAPSULE ORAL
COMMUNITY
End: 2021-06-14

## 2019-10-14 RX ORDER — CHLORHEXIDINE GLUCONATE 0.12 MG/ML
RINSE ORAL
Refills: 0 | COMMUNITY
Start: 2019-07-16 | End: 2020-06-05

## 2019-10-14 ASSESSMENT — ENCOUNTER SYMPTOMS
SHORTNESS OF BREATH: 0
CONSTIPATION: 0
WHEEZING: 0
ABDOMINAL PAIN: 0
COUGH: 0
DIARRHEA: 0

## 2019-12-08 DIAGNOSIS — I10 ESSENTIAL HYPERTENSION: ICD-10-CM

## 2019-12-09 RX ORDER — AMLODIPINE BESYLATE 2.5 MG/1
TABLET ORAL
Qty: 90 TABLET | Refills: 1 | Status: SHIPPED | OUTPATIENT
Start: 2019-12-09 | End: 2020-07-17

## 2020-03-03 RX ORDER — ATORVASTATIN CALCIUM 20 MG/1
TABLET, FILM COATED ORAL
Qty: 90 TABLET | Refills: 3 | Status: SHIPPED | OUTPATIENT
Start: 2020-03-03 | End: 2021-02-22 | Stop reason: SDUPTHER

## 2020-03-03 RX ORDER — ESCITALOPRAM OXALATE 10 MG/1
TABLET ORAL
Qty: 90 TABLET | Refills: 3 | Status: SHIPPED | OUTPATIENT
Start: 2020-03-03 | End: 2021-02-22 | Stop reason: SDUPTHER

## 2020-03-04 RX ORDER — PANTOPRAZOLE SODIUM 40 MG/1
TABLET, DELAYED RELEASE ORAL
Qty: 90 TABLET | Refills: 3 | Status: SHIPPED | OUTPATIENT
Start: 2020-03-04 | End: 2021-03-01 | Stop reason: SDUPTHER

## 2020-06-03 LAB
ANION GAP SERPL CALCULATED.3IONS-SCNC: 6.4 MMOL/L
BUN BLDV-MCNC: 17 MG/DL (ref 7–17)
CALCIUM SERPL-MCNC: 9.4 MG/DL (ref 8.4–10.2)
CHLORIDE BLD-SCNC: 106 MMOL/L (ref 98–120)
CHOLESTEROL/HDL RATIO: 3.67 RATIO (ref 0–4.5)
CHOLESTEROL: 213 MG/DL (ref 50–200)
CO2: 26 MMOL/L (ref 22–31)
CREAT SERPL-MCNC: 0.8 MG/DL (ref 0.5–1)
GFR CALCULATED: > 60
GLUCOSE: 114 MG/DL (ref 65–105)
HDLC SERPL-MCNC: 58 MG/DL (ref 36–68)
LDL CHOLESTEROL CALCULATED: 108.8 MG/DL (ref 0–160)
POTASSIUM SERPL-SCNC: 3.9 MMOL/L (ref 3.6–5)
SODIUM BLD-SCNC: 139 MMOL/L (ref 135–145)
TRIGL SERPL-MCNC: 231 MG/DL (ref 10–250)
VLDLC SERPL CALC-MCNC: 46 MG/DL (ref 0–40)

## 2020-06-05 ENCOUNTER — OFFICE VISIT (OUTPATIENT)
Dept: FAMILY MEDICINE CLINIC | Age: 65
End: 2020-06-05
Payer: COMMERCIAL

## 2020-06-05 VITALS
OXYGEN SATURATION: 96 % | BODY MASS INDEX: 28.49 KG/M2 | HEART RATE: 59 BPM | WEIGHT: 188 LBS | SYSTOLIC BLOOD PRESSURE: 112 MMHG | DIASTOLIC BLOOD PRESSURE: 68 MMHG | HEIGHT: 68 IN

## 2020-06-05 LAB — HBA1C MFR BLD: 6.2 %

## 2020-06-05 PROCEDURE — 99214 OFFICE O/P EST MOD 30 MIN: CPT | Performed by: FAMILY MEDICINE

## 2020-06-05 PROCEDURE — 83036 HEMOGLOBIN GLYCOSYLATED A1C: CPT | Performed by: FAMILY MEDICINE

## 2020-06-05 PROCEDURE — 90471 IMMUNIZATION ADMIN: CPT | Performed by: FAMILY MEDICINE

## 2020-06-05 PROCEDURE — 90715 TDAP VACCINE 7 YRS/> IM: CPT | Performed by: FAMILY MEDICINE

## 2020-06-05 RX ORDER — VITAMIN B COMPLEX
1 CAPSULE ORAL DAILY
COMMUNITY
End: 2021-12-13

## 2020-06-05 NOTE — PROGRESS NOTES
Social History     Tobacco Use    Smoking status: Former Smoker    Smokeless tobacco: Never Used   Substance Use Topics    Alcohol use: Yes      Current Outpatient Medications   Medication Sig Dispense Refill    b complex vitamins capsule Take 1 capsule by mouth daily      Omega-3 Fatty Acids (OMEGA 3 PO) Take by mouth      pantoprazole (PROTONIX) 40 MG tablet take 1 tablet by mouth once daily 90 tablet 3    atorvastatin (LIPITOR) 20 MG tablet take 1 tablet by mouth once daily 90 tablet 3    escitalopram (LEXAPRO) 10 MG tablet take 1 tablet by mouth once daily 90 tablet 3    amLODIPine (NORVASC) 2.5 MG tablet take 1 tablet by mouth once daily 90 tablet 1    Apple Cider Vinegar 500 MG TABS Take by mouth      RA DAILY GARLIC PO Take by mouth      fluticasone (FLONASE) 50 MCG/ACT nasal spray 1 spray by Nasal route daily 3 Bottle 3    Multiple Vitamins-Minerals (THERAPEUTIC MULTIVITAMIN-MINERALS) tablet Take 1 tablet by mouth daily      Calcium Carbonate-Vitamin D (CALCIUM-VITAMIN D) 500-200 MG-UNIT per tablet Take 1 tablet by mouth 2 times daily (with meals)      aspirin 325 MG EC tablet Take 325 mg by mouth daily        No current facility-administered medications for this visit.       Allergies   Allergen Reactions    Bupropion Other (See Comments)     irritable    Chantix [Varenicline]      Irritable         Health Maintenance   Topic Date Due    HIV screen  02/05/1971    Shingles Vaccine (2 of 3) 12/09/2017    Cervical cancer screen  12/23/2019    Lipid screen  06/03/2021    A1C test (Diabetic or Prediabetic)  06/05/2021    Breast cancer screen  08/06/2021    Colon cancer screen colonoscopy  07/30/2028    DTaP/Tdap/Td vaccine (3 - Td) 06/05/2030    Flu vaccine  Completed    Pneumococcal 0-64 years Vaccine  Completed    Hepatitis C screen  Completed    Hepatitis A vaccine  Aged Out    Hepatitis B vaccine  Aged Out    Hib vaccine  Aged Out    Meningococcal (ACWY) vaccine  Aged Out

## 2020-06-07 ASSESSMENT — ENCOUNTER SYMPTOMS
ORTHOPNEA: 0
SHORTNESS OF BREATH: 0
BLURRED VISION: 0

## 2020-07-15 NOTE — TELEPHONE ENCOUNTER
Jose Quijano is requesting a refill on the following medication(s):  Requested Prescriptions     Pending Prescriptions Disp Refills    amLODIPine (NORVASC) 2.5 MG tablet [Pharmacy Med Name: AMLODIPINE BESYLATE 2.5 MG TAB] 90 tablet 1     Sig: take 1 tablet by mouth once daily       Last Visit Date (If Applicable):  1/6/3638    Next Visit Date:    12/10/2020

## 2020-07-17 RX ORDER — AMLODIPINE BESYLATE 2.5 MG/1
TABLET ORAL
Qty: 90 TABLET | Refills: 1 | Status: SHIPPED | OUTPATIENT
Start: 2020-07-17 | End: 2020-12-10 | Stop reason: SDUPTHER

## 2020-12-10 ENCOUNTER — OFFICE VISIT (OUTPATIENT)
Dept: FAMILY MEDICINE CLINIC | Age: 65
End: 2020-12-10
Payer: COMMERCIAL

## 2020-12-10 ENCOUNTER — HOSPITAL ENCOUNTER (OUTPATIENT)
Age: 65
Setting detail: SPECIMEN
Discharge: HOME | End: 2020-12-10
Payer: COMMERCIAL

## 2020-12-10 VITALS
BODY MASS INDEX: 27.98 KG/M2 | SYSTOLIC BLOOD PRESSURE: 122 MMHG | DIASTOLIC BLOOD PRESSURE: 82 MMHG | HEART RATE: 72 BPM | OXYGEN SATURATION: 96 % | WEIGHT: 184 LBS

## 2020-12-10 LAB
ABSOLUTE EOS #: 0.14 K/UL (ref 0–0.44)
ABSOLUTE IMMATURE GRANULOCYTE: <0.03 K/UL (ref 0–0.3)
ABSOLUTE LYMPH #: 1.38 K/UL (ref 1.1–3.7)
ABSOLUTE MONO #: 0.37 K/UL (ref 0.1–1.2)
BASOPHILS # BLD: 1 % (ref 0–2)
BASOPHILS ABSOLUTE: 0.05 K/UL (ref 0–0.2)
DIFFERENTIAL TYPE: ABNORMAL
EOSINOPHILS RELATIVE PERCENT: 3 % (ref 1–4)
HCT VFR BLD CALC: 38.8 % (ref 36.3–47.1)
HEMOGLOBIN: 12.8 G/DL (ref 11.9–15.1)
IMMATURE GRANULOCYTES: 0 %
LYMPHOCYTES # BLD: 29 % (ref 24–43)
MCH RBC QN AUTO: 31.9 PG (ref 25.2–33.5)
MCHC RBC AUTO-ENTMCNC: 33 G/DL (ref 25.2–33.5)
MCV RBC AUTO: 96.8 FL (ref 82.6–102.9)
MONOCYTES # BLD: 8 % (ref 3–12)
NRBC AUTOMATED: 0 PER 100 WBC
PDW BLD-RTO: 11.7 % (ref 11.8–14.4)
PLATELET # BLD: 325 K/UL (ref 138–453)
PLATELET ESTIMATE: ABNORMAL
PMV BLD AUTO: 9.1 FL (ref 8.1–13.5)
RBC # BLD: 4.01 M/UL (ref 3.95–5.11)
RBC # BLD: ABNORMAL 10*6/UL
SEG NEUTROPHILS: 59 % (ref 36–65)
SEGMENTED NEUTROPHILS ABSOLUTE COUNT: 2.86 K/UL (ref 1.5–8.1)
WBC # BLD: 4.8 K/UL (ref 3.5–11.3)
WBC # BLD: ABNORMAL 10*3/UL

## 2020-12-10 PROCEDURE — 99214 OFFICE O/P EST MOD 30 MIN: CPT | Performed by: FAMILY MEDICINE

## 2020-12-10 PROCEDURE — 90471 IMMUNIZATION ADMIN: CPT | Performed by: FAMILY MEDICINE

## 2020-12-10 PROCEDURE — 85025 COMPLETE CBC W/AUTO DIFF WBC: CPT

## 2020-12-10 PROCEDURE — 90686 IIV4 VACC NO PRSV 0.5 ML IM: CPT | Performed by: FAMILY MEDICINE

## 2020-12-10 PROCEDURE — 36415 COLL VENOUS BLD VENIPUNCTURE: CPT

## 2020-12-10 RX ORDER — AMLODIPINE BESYLATE 2.5 MG/1
TABLET ORAL
Qty: 90 TABLET | Refills: 1 | Status: SHIPPED | OUTPATIENT
Start: 2020-12-10 | End: 2021-06-14

## 2020-12-10 ASSESSMENT — ENCOUNTER SYMPTOMS
ORTHOPNEA: 0
SHORTNESS OF BREATH: 0
BLURRED VISION: 0

## 2020-12-10 ASSESSMENT — PATIENT HEALTH QUESTIONNAIRE - PHQ9
SUM OF ALL RESPONSES TO PHQ QUESTIONS 1-9: 0
2. FEELING DOWN, DEPRESSED OR HOPELESS: 0
SUM OF ALL RESPONSES TO PHQ QUESTIONS 1-9: 0
1. LITTLE INTEREST OR PLEASURE IN DOING THINGS: 0
SUM OF ALL RESPONSES TO PHQ9 QUESTIONS 1 & 2: 0
SUM OF ALL RESPONSES TO PHQ QUESTIONS 1-9: 0

## 2020-12-10 NOTE — PATIENT INSTRUCTIONS
The Covid 19 flu shots that are seeking FDA approval at this time for emergency use are very promising. The effectiveness is around 95% which is wonderful. They are both to dose vaccines, 3 to 4 weeks apart for both doses. Safety and effectiveness appears to be very equal between the 2. The difference for patient's will likely end up being availability due to the storage requirements of these vaccines. Both vaccines may not be available in all locations. At this time it appears either vaccine will be appropriate to receive. I feel very comfortable with the safety of these vaccines for myself, my patients, and my family. The technology behind these vaccines has been in development for a number of years and in fact SARS research has been going on at the [facility] for a decade just not related to the COVID-19 virus. The companies that have successfully developed these vaccines have used the technology that was already in development and applied it to the need at hand. The current plan appears to be to open vaccination to healthcare providers nursing homes and nursing home residents first followed by high risk patients such as the elderly and those with health conditions, followed by the general population. I am hopeful that we will be able to start receiving immunizations in this community at the end of December 2020. Side effects of the Covid vaccine are thought to be mild and may include injection site redness or soreness, general fatigue and achiness with perhaps even a low-grade fever for 24 to 48 hours as the immune system gets ready to respond. These can be managed very effectively with over-the-counter Tylenol or ibuprofen as needed for symptom control. It is also important to remember that a vaccine that is 95% effective is wonderful but that means that it is 5% ineffective. The importance of wearing a mask, social distancing and frequent handwashing will not go away for some time to come. We will need to continue to practice these measures to protect ourselves and her loved ones from COVID-19 infection. I would certainly encourage you to receive the vaccine when it is available.

## 2020-12-10 NOTE — PROGRESS NOTES
[address] [facility]  [address]  Dept: [phone]  Dept [facility]:[phone]    Sean Barber is a 60 y.o. female who presents today for her medical conditions/complaints as notedbelow. Sean Barber is c/o of Gastroesophageal Reflux (6 month f/u)      HPI:     Gastroesophageal Reflux   She reports no chest pain. Hypertension   This is a chronic problem. The current episode started more than 1 year ago. The problem is unchanged. The problem is controlled. Pertinent negatives include no anxiety, blurred vision, chest pain, headaches, malaise/fatigue, neck pain, orthopnea, palpitations, peripheral edema, PND, shortness of breath or sweats. There are no associated agents to hypertension. Risk factors for coronary artery disease include dyslipidemia, family history, post-menopausal state and smoking/tobacco exposure (Remain smoke-free). Past treatments include calcium channel blockers. The current treatment provides significant improvement. There are no compliance problems. Has been feeling good overall. Mood is OK. She does have some stress worrying about her sister who is not doing quite as well. She is sleeping fine. She is staying active. She has not had any down or depressed symptoms. She has been trying to watch her weight trying to lose little bit of weight in light of her history of impaired fasting glucose. Has been taking the cholesterol meds regularly. No side effects. Denies of chest pain shortness of breath palpitations no lower extremity edema. No cardiac signs or symptoms. She has a history of smoking and remain smoke-free at this time.  Diabetes Sister        Social History     Tobacco Use    Smoking status: Former Smoker    Smokeless tobacco: Never Used   Substance Use Topics    Alcohol use: Yes      Current Outpatient Medications   Medication Sig Dispense Refill    amLODIPine (NORVASC) 2.5 MG tablet take 1 tablet by mouth once daily 90 tablet 1    b complex vitamins capsule Take 1 capsule by mouth daily      Omega-3 Fatty Acids (OMEGA 3 PO) Take by mouth      pantoprazole (PROTONIX) 40 MG tablet take 1 tablet by mouth once daily 90 tablet 3    atorvastatin (LIPITOR) 20 MG tablet take 1 tablet by mouth once daily 90 tablet 3    escitalopram (LEXAPRO) 10 MG tablet take 1 tablet by mouth once daily 90 tablet 3    Apple Cider Vinegar 500 MG TABS Take by mouth      RA DAILY GARLIC PO Take by mouth      fluticasone (FLONASE) 50 MCG/ACT nasal spray 1 spray by Nasal route daily 3 Bottle 3    Multiple Vitamins-Minerals (THERAPEUTIC MULTIVITAMIN-MINERALS) tablet Take 1 tablet by mouth daily      Calcium Carbonate-Vitamin D (CALCIUM-VITAMIN D) 500-200 MG-UNIT per tablet Take 1 tablet by mouth 2 times daily (with meals)      aspirin 325 MG EC tablet Take 325 mg by mouth daily        No current facility-administered medications for this visit.       Allergies   Allergen Reactions    Bupropion Other (See Comments)     irritable    Chantix [Varenicline]      Irritable         Health Maintenance   Topic Date Due    HIV screen  02/05/1971    Shingles Vaccine (2 of 3) 12/09/2017    Cervical cancer screen  12/23/2019    Lipid screen  06/03/2021    A1C test (Diabetic or Prediabetic)  06/05/2021    Breast cancer screen  08/06/2021    Colon cancer screen colonoscopy  07/30/2028    DTaP/Tdap/Td vaccine (3 - Td) 06/05/2030    Flu vaccine  Completed    Pneumococcal 0-64 years Vaccine  Completed    Hepatitis C screen  Completed    Hepatitis A vaccine  Aged Out    Hepatitis B vaccine  Aged Out    Hib vaccine  Aged Out  Meningococcal (ACWY) vaccine  Aged Out       Subjective:      Review of Systems   Constitutional: Negative for malaise/fatigue. Eyes: Negative for blurred vision. Respiratory: Negative for shortness of breath. Cardiovascular: Negative for chest pain, palpitations, orthopnea and PND. Musculoskeletal: Negative for neck pain. Neurological: Negative for headaches. Objective:     /82 (Site: Left Upper Arm, Position: Sitting, Cuff Size: Medium Adult)   Pulse 72   Wt 184 lb (83.5 kg)   SpO2 96%   BMI 27.98 kg/m²     Physical Exam  Vitals signs and nursing note reviewed. Constitutional:       Appearance: Normal appearance. She is well-developed. HENT:      Head: Normocephalic and atraumatic. Eyes:      Conjunctiva/sclera: Conjunctivae normal.   Neck:      Musculoskeletal: Normal range of motion and neck supple. Thyroid: No thyromegaly. Vascular: No JVD. Cardiovascular:      Rate and Rhythm: Normal rate and regular rhythm. Heart sounds: Normal heart sounds. No murmur. No friction rub. No gallop. Pulmonary:      Effort: Pulmonary effort is normal. No respiratory distress. Breath sounds: Normal breath sounds. Abdominal:      Palpations: Abdomen is soft. Lymphadenopathy:      Cervical: No cervical adenopathy. Skin:     General: Skin is warm. Capillary Refill: Capillary refill takes less than 2 seconds. Neurological:      Mental Status: She is alert and oriented to person, place, and time. Assessment/Plan:      Diagnosis Orders   1. Depression with anxiety     2. Rectal bleeding  [facility], DO, General Surgery, [place]    CBC Auto Differential   3. Needs flu shot  INFLUENZA, QUADV, 3 YRS AND OLDER, IM PF, PREFILL SYR OR SDV, 0.5ML (AFLURIA QUADV, PF)   4. Benign neoplasm of colon, unspecified part of colon     5.  History of colon polyps  [facility], DO, General Surgery, [place] 6. Essential hypertension  amLODIPine (NORVASC) 2.5 MG tablet    Comprehensive Metabolic Panel   7. Impaired fasting blood sugar  Comprehensive Metabolic Panel   8. Mixed hyperlipidemia  Lipid Panel    Comprehensive Metabolic Panel   9. Gastroesophageal reflux disease without esophagitis       In light of the is episode of pain and rectal bleeding she certainly needs reevaluation for a possible colonoscopy. We will check a CBC today to make sure that her hemoglobin is stable. Advised if a recurrence of pain that does not resolve quickly or recurrence of persistent bleeding she is to call or go to the emergency room immediately. Hypertension and hyperlipidemia are both well controlled at this time. No changes in her current medications. Encouraged her diet and exercise for risk factor modification. Recheck labs in 6 months including an A1c at that time. Continue on her pantoprazole at this time. Continue dietary modifications to prevent GERD symptoms as well. Doing well on her current SSRI dose. Encouraged her to continue to stay in close contact with her sister. Lab Results   Component Value Date    WBC 4.8 12/10/2020    HGB 12.8 12/10/2020    HCT 38.8 12/10/2020     12/10/2020    CHOL 213 (H) 06/03/2020    TRIG 231 06/03/2020    HDL 58 06/03/2020    ALT 32 12/17/2018    AST 27 12/17/2018     06/03/2020    K 3.9 06/03/2020     06/03/2020    CREATININE 0.8 06/03/2020    BUN 17 06/03/2020    CO2 26 06/03/2020    GLUF 101 01/09/2018    LABA1C 6.2 06/05/2020       Return in about 6 months (around 6/10/2021) for Pap/ well woman. Patient given educational materials - see patientinstructions. Discussed use, benefit, and side effects of prescribed medications. All patient questions answered. Pt voiced understanding. Reviewed health maintenance. Instructed to continue current medications, diet andexercise. Patient agreed with treatment plan. Follow up as directed. (Please note that portions of this note were completed with a voice-recognition program. Efforts were made to edit the dictation but occasionally words are mis-transcribed.)    Electronically signed by Ranjan Mcpherson MD on 12/13/2020

## 2020-12-22 ENCOUNTER — OFFICE VISIT (OUTPATIENT)
Dept: SURGERY | Age: 65
End: 2020-12-22
Payer: COMMERCIAL

## 2020-12-22 VITALS
TEMPERATURE: 98.9 F | SYSTOLIC BLOOD PRESSURE: 97 MMHG | BODY MASS INDEX: 27.59 KG/M2 | WEIGHT: 186.3 LBS | HEART RATE: 62 BPM | DIASTOLIC BLOOD PRESSURE: 77 MMHG | HEIGHT: 69 IN

## 2020-12-22 PROCEDURE — 99213 OFFICE O/P EST LOW 20 MIN: CPT | Performed by: SURGERY

## 2020-12-22 SDOH — HEALTH STABILITY: MENTAL HEALTH: HOW OFTEN DO YOU HAVE A DRINK CONTAINING ALCOHOL?: 2-4 TIMES A MONTH

## 2020-12-22 SDOH — HEALTH STABILITY: MENTAL HEALTH: HOW MANY STANDARD DRINKS CONTAINING ALCOHOL DO YOU HAVE ON A TYPICAL DAY?: 1 OR 2

## 2020-12-22 NOTE — PROGRESS NOTES
Sheri Saeed is a 60 y.o. female who presents today for evaluation of recent rectal bleeding and lower abdominal pain. Patient reports approximately 4 weeks ago she had a bowel movement and when she finished and looked at the toilet water was full of blood. She denies any pain with her bowel movement at that time and was not having any abdominal pain. About a week later she had 1 to 2 days of lower abdominal pain that she states was crampy in nature and was in bilateral lower quadrants. During that time she is not having bowel movements but was continuing to pass flatus. She states that day after the pain started she did have a bowel movement which was mucousy and when she had the bowel movement the pain resolved. Since that time has had no further abdominal pains and no bleeding. Because of the bleeding she was seen by her PCP and was referred to general surgery for further evaluation. Patient denies any other changes in bowel movements and states that typically her bowel movements are soft and formed. Has not noticed any caliber or color change. Denies any unintended weight loss. No family history of inflammatory bowel diseases reported. Does have family history of colon cancer in a second-degree relative but no other family members. Patient has had colonoscopies in the past.  Her first 1 was in 2008 and apparently had multiple polyps removed. Her second colonoscopy was actually in July 2018 and she was noted to have 1 hyperplastic polyp in addition to diverticulosis and internal hemorrhoids. She presents today for evaluation of rectal bleeding.     Past Medical History:   Diagnosis Date    Depression with anxiety 8/24/2017    History of colon polyps     benign    Hyperlipidemia     Recurrent major depressive disorder, in full remission ([facility] [address] 7/11/2018       Past Surgical History:   Procedure Laterality Date    COLONOSCOPY  08/2008    mynor many small polyps due 2018  SKIN CANCER EXCISION  04/2014    TONSILLECTOMY         Current Outpatient Medications   Medication Sig Dispense Refill    amLODIPine (NORVASC) 2.5 MG tablet take 1 tablet by mouth once daily 90 tablet 1    Omega-3 Fatty Acids (OMEGA 3 PO) Take by mouth      pantoprazole (PROTONIX) 40 MG tablet take 1 tablet by mouth once daily 90 tablet 3    atorvastatin (LIPITOR) 20 MG tablet take 1 tablet by mouth once daily 90 tablet 3    escitalopram (LEXAPRO) 10 MG tablet take 1 tablet by mouth once daily 90 tablet 3    fluticasone (FLONASE) 50 MCG/ACT nasal spray 1 spray by Nasal route daily 3 Bottle 3    Multiple Vitamins-Minerals (THERAPEUTIC MULTIVITAMIN-MINERALS) tablet Take 1 tablet by mouth daily      Calcium Carbonate-Vitamin D (CALCIUM-VITAMIN D) 500-200 MG-UNIT per tablet Take 1 tablet by mouth 2 times daily (with meals)      aspirin 325 MG EC tablet Take 325 mg by mouth daily       b complex vitamins capsule Take 1 capsule by mouth daily      Apple Cider Vinegar 500 MG TABS Take by mouth      RA DAILY GARLIC PO Take by mouth       No current facility-administered medications for this visit.         Allergies   Allergen Reactions    Bupropion Other (See Comments)     irritable    Chantix [Varenicline]      Irritable         Family History   Problem Relation Age of Onset    High Blood Pressure Mother     Heart Disease Mother     Diabetes Mother     Thyroid Disease Mother     Heart Surgery Father     Heart Disease Father     Thyroid Disease Sister     Diabetes Sister     Diabetes Brother        Social History     Socioeconomic History    Marital status:      Spouse name: Not on file    Number of children: Not on file    Years of education: Not on file    Highest education level: Not on file   Occupational History    Not on file   Social Needs    Financial resource strain: Not on file    Food insecurity     Worry: Not on file     Inability: Not on file   [employer] needs Medical: Not on file     Non-medical: Not on file   Tobacco Use    Smoking status: Former Smoker     Packs/day: 2.00     Types: Cigarettes    Smokeless tobacco: Never Used   Substance and Sexual Activity    Alcohol use: Yes     Frequency: 2-4 times a month     Drinks per session: 1 or 2     Binge frequency: Less than monthly    Drug use: No    Sexual activity: Not on file   Lifestyle    Physical activity     Days per week: Not on file     Minutes per session: Not on file    Stress: Not on file   Relationships    Social connections     Talks on phone: Not on file     Gets together: Not on file     Attends Baptism service: Not on file     Active member of club or organization: Not on file     Attends meetings of clubs or organizations: Not on file     Relationship status: Not on file    Intimate partner violence     Fear of current or ex partner: Not on file     Emotionally abused: Not on file     Physically abused: Not on file     Forced sexual activity: Not on file   Other Topics Concern    Not on file   Social History Narrative    Not on file       ROS:   Review of Systems - General ROS: negative  Psychological ROS: negative  Ophthalmic ROS: negative  ENT ROS: negative  Respiratory ROS: no cough, shortness of breath, or wheezing  Cardiovascular ROS: no chest pain or dyspnea on exertion  Gastrointestinal ROS: Per HPI  Genito-Urinary ROS: no dysuria, trouble voiding, or hematuria  Musculoskeletal ROS: negative  Neurological ROS: no TIA or stroke symptoms      Objective   Vitals:    12/22/20 1001   BP: 97/77   Pulse: 62   Temp: 98.9 °F (37.2 °C)     General:in no apparent distress and well developed and well nourished  Eyes: No gross abnormalities.   Ears, Nose, Throat: hearing grossly normal bilaterally  Neck: neck supple and non tender without mass  Lungs: clear to auscultation without wheezes or rales   Heart: S1S2, no mumurs, RRR  Abdomen: soft, nontender, no HSM, no guarding, no rebound, no masses Extremity: negative  Neuro: CN II-XII grossly intact      Assessment     3  36-year-old female with isolated incident of rectal bleeding and lower abdominal pain -has had no further symptoms since incident. Plan     1. At this point the patient has had colonoscopy in the past 1-1/2 years so I do not think we need to proceed with a repeat colonoscopy. Is possible that the patient had bleeding from internal hemorrhoid or possible diverticular bleed but I think this is less likely. Most likely she had bleeding from internal hemorrhoids which resolved spontaneously. Her lower abdominal pain seems to been short-lived and self resolved. This likely represented possibly a mild case of diverticulitis versus viral illness. I discussed with the patient options which include proceeding with a repeat colonoscopy possible hemorrhoid banding versus conservative management and watchful waiting. At this point she would like to proceed conservatively. She may continue regular follow-up with PCP. Should she have repeat episodes of pain or bleeding we will plan to see her back in office for further recommendations.     Electronically signed by Anila Dickson DO on 12/22/2020 at 10:29 AM      (Please note that portions of this note were completed with a voice recognition program.  Efforts were made to edit the dictations but occasionally words are mis-transcribed.)

## 2021-02-22 DIAGNOSIS — F41.8 DEPRESSION WITH ANXIETY: ICD-10-CM

## 2021-02-22 NOTE — TELEPHONE ENCOUNTER
Major Rodriguez is requesting a refill on the following medication(s):  Requested Prescriptions     Pending Prescriptions Disp Refills    escitalopram (LEXAPRO) 10 MG tablet 90 tablet 3     Sig: take 1 tablet by mouth once daily       Last Visit Date (If Applicable):  33/44/4128    Next Visit Date:    6/14/2021

## 2021-02-23 RX ORDER — ESCITALOPRAM OXALATE 10 MG/1
TABLET ORAL
Qty: 90 TABLET | Refills: 3 | Status: SHIPPED | OUTPATIENT
Start: 2021-02-23 | End: 2021-12-13 | Stop reason: SDUPTHER

## 2021-02-23 RX ORDER — ATORVASTATIN CALCIUM 20 MG/1
TABLET, FILM COATED ORAL
Qty: 90 TABLET | Refills: 3 | Status: SHIPPED | OUTPATIENT
Start: 2021-02-23 | End: 2022-02-17

## 2021-03-01 DIAGNOSIS — K21.9 GASTROESOPHAGEAL REFLUX DISEASE WITHOUT ESOPHAGITIS: ICD-10-CM

## 2021-03-01 RX ORDER — PANTOPRAZOLE SODIUM 40 MG/1
TABLET, DELAYED RELEASE ORAL
Qty: 90 TABLET | Refills: 3 | Status: SHIPPED | OUTPATIENT
Start: 2021-03-01 | End: 2022-02-17

## 2021-06-10 LAB
ALBUMIN/GLOBULIN RATIO: 1.6 G/DL
ALBUMIN: 4.5 G/DL (ref 3.5–5)
ALP BLD-CCNC: 77 UNITS/L (ref 38–126)
ALT SERPL-CCNC: 20 UNITS/L (ref 4–35)
ANION GAP SERPL CALCULATED.3IONS-SCNC: 8.8 MMOL/L
AST SERPL-CCNC: 28 UNITS/L (ref 14–36)
BILIRUB SERPL-MCNC: 0.5 MG/DL (ref 0.2–1.3)
BUN BLDV-MCNC: 14 MG/DL (ref 7–17)
CALCIUM SERPL-MCNC: 9.5 MG/DL (ref 8.4–10.2)
CHLORIDE BLD-SCNC: 105 MMOL/L (ref 98–120)
CHOLESTEROL/HDL RATIO: 5.37 RATIO (ref 0–4.5)
CHOLESTEROL: 279 MG/DL (ref 50–200)
CO2: 26 MMOL/L (ref 22–31)
CREAT SERPL-MCNC: 0.8 MG/DL (ref 0.5–1)
GFR CALCULATED: > 60
GLOBULIN: 2.8 G/DL
GLUCOSE: 105 MG/DL (ref 65–105)
HDLC SERPL-MCNC: 52 MG/DL (ref 36–68)
LDL CHOLESTEROL CALCULATED: 197.6 MG/DL (ref 0–160)
POTASSIUM SERPL-SCNC: 4.4 MMOL/L (ref 3.6–5)
SODIUM BLD-SCNC: 140 MMOL/L (ref 135–145)
TOTAL PROTEIN, SERUM: 7.4 G/DL (ref 6.3–8.2)
TRIGL SERPL-MCNC: 147 MG/DL (ref 10–250)
VLDLC SERPL CALC-MCNC: 29 MG/DL (ref 0–50)

## 2021-06-11 DIAGNOSIS — I10 ESSENTIAL HYPERTENSION: ICD-10-CM

## 2021-06-11 NOTE — TELEPHONE ENCOUNTER
Laura Oleary is requesting a refill on the following medication(s):  Requested Prescriptions     Pending Prescriptions Disp Refills    amLODIPine (NORVASC) 2.5 MG tablet [Pharmacy Med Name: AMLODIPINE BESYLATE 2.5 MG TAB] 90 tablet 1     Sig: take 1 tablet by mouth once daily       Last Visit Date (If Applicable):  76/78/0618    Next Visit Date:    6/14/2021

## 2021-06-14 ENCOUNTER — OFFICE VISIT (OUTPATIENT)
Dept: FAMILY MEDICINE CLINIC | Age: 66
End: 2021-06-14
Payer: COMMERCIAL

## 2021-06-14 VITALS
OXYGEN SATURATION: 96 % | BODY MASS INDEX: 26.06 KG/M2 | HEART RATE: 56 BPM | WEIGHT: 176.5 LBS | DIASTOLIC BLOOD PRESSURE: 64 MMHG | SYSTOLIC BLOOD PRESSURE: 134 MMHG

## 2021-06-14 DIAGNOSIS — Z78.0 MENOPAUSE: ICD-10-CM

## 2021-06-14 DIAGNOSIS — Z01.419 PAP SMEAR, AS PART OF ROUTINE GYNECOLOGICAL EXAMINATION: Primary | ICD-10-CM

## 2021-06-14 DIAGNOSIS — R73.01 IMPAIRED FASTING BLOOD SUGAR: ICD-10-CM

## 2021-06-14 DIAGNOSIS — Z01.419 ENCOUNTER FOR GYNECOLOGICAL EXAMINATION WITHOUT ABNORMAL FINDING: ICD-10-CM

## 2021-06-14 DIAGNOSIS — Z23 NEED FOR PROPHYLACTIC VACCINATION AGAINST STREPTOCOCCUS PNEUMONIAE (PNEUMOCOCCUS): ICD-10-CM

## 2021-06-14 DIAGNOSIS — Z12.31 VISIT FOR SCREENING MAMMOGRAM: ICD-10-CM

## 2021-06-14 DIAGNOSIS — E78.2 MIXED HYPERLIPIDEMIA: ICD-10-CM

## 2021-06-14 LAB — HBA1C MFR BLD: 5.8 %

## 2021-06-14 PROCEDURE — 90670 PCV13 VACCINE IM: CPT | Performed by: FAMILY MEDICINE

## 2021-06-14 PROCEDURE — 83036 HEMOGLOBIN GLYCOSYLATED A1C: CPT | Performed by: FAMILY MEDICINE

## 2021-06-14 PROCEDURE — 99397 PER PM REEVAL EST PAT 65+ YR: CPT | Performed by: FAMILY MEDICINE

## 2021-06-14 PROCEDURE — 90471 IMMUNIZATION ADMIN: CPT | Performed by: FAMILY MEDICINE

## 2021-06-14 RX ORDER — AMLODIPINE BESYLATE 2.5 MG/1
TABLET ORAL
Qty: 90 TABLET | Refills: 1 | Status: SHIPPED | OUTPATIENT
Start: 2021-06-14 | End: 2021-12-09

## 2021-06-14 SDOH — ECONOMIC STABILITY: TRANSPORTATION INSECURITY
IN THE PAST 12 MONTHS, HAS LACK OF TRANSPORTATION KEPT YOU FROM MEETINGS, WORK, OR FROM GETTING THINGS NEEDED FOR DAILY LIVING?: NO

## 2021-06-14 SDOH — ECONOMIC STABILITY: TRANSPORTATION INSECURITY
IN THE PAST 12 MONTHS, HAS THE LACK OF TRANSPORTATION KEPT YOU FROM MEDICAL APPOINTMENTS OR FROM GETTING MEDICATIONS?: NO

## 2021-06-14 SDOH — ECONOMIC STABILITY: FOOD INSECURITY: WITHIN THE PAST 12 MONTHS, YOU WORRIED THAT YOUR FOOD WOULD RUN OUT BEFORE YOU GOT MONEY TO BUY MORE.: NEVER TRUE

## 2021-06-14 SDOH — ECONOMIC STABILITY: FOOD INSECURITY: WITHIN THE PAST 12 MONTHS, THE FOOD YOU BOUGHT JUST DIDN'T LAST AND YOU DIDN'T HAVE MONEY TO GET MORE.: NEVER TRUE

## 2021-06-14 ASSESSMENT — PATIENT HEALTH QUESTIONNAIRE - PHQ9
SUM OF ALL RESPONSES TO PHQ QUESTIONS 1-9: 0
SUM OF ALL RESPONSES TO PHQ QUESTIONS 1-9: 0
1. LITTLE INTEREST OR PLEASURE IN DOING THINGS: 0
2. FEELING DOWN, DEPRESSED OR HOPELESS: 0
SUM OF ALL RESPONSES TO PHQ9 QUESTIONS 1 & 2: 0
SUM OF ALL RESPONSES TO PHQ QUESTIONS 1-9: 0

## 2021-06-14 ASSESSMENT — SOCIAL DETERMINANTS OF HEALTH (SDOH): HOW HARD IS IT FOR YOU TO PAY FOR THE VERY BASICS LIKE FOOD, HOUSING, MEDICAL CARE, AND HEATING?: NOT HARD AT ALL

## 2021-06-14 NOTE — PROGRESS NOTES
Nila Kirk  1955  MRN:  [id number]  Date of visit:21    Subjective:    Nila Kirk is a 73 y.o. female who presentsto [address] [place] [address] today (21) for annual female exam and Pap smear. Her last Pap test was 2016 and was normal.    No results found for: CYTORPT   She is a  No obstetric history on file. .  No LMP recorded. Patient is postmenopausal.  She has had no abnormal Pap tests that she is aware of. Has not been taking her atorvastatin for the last month as she has been drinking a lot of pink grapefruit flavered water-- not actually graferit juice or fruit. Has lost 10 lbs!!     No CP, no SOB. No left swelling.     Chief Complaint   Patient presents with    Gynecologic Exam     yearly physical        She has the following problem list:  Patient Active Problem List   Diagnosis    Benign neoplasm of colon    Eustachian tube disorder NOS    Hyperlipidemia    GERD (gastroesophageal reflux disease)    IFG (impaired fasting glucose)    Cerebral aneurysm    Non-ischemic cardiomyopathy (HCC)    Retinal detachment    Depression with anxiety    History of colon polyps    Recurrent major depressive disorder, in full remission ([facility] [address]    Family history of thyroid disease in mother        Current medications are:  Current Outpatient Medications   Medication Sig Dispense Refill    amLODIPine (NORVASC) 2.5 MG tablet take 1 tablet by mouth once daily 90 tablet 1    pantoprazole (PROTONIX) 40 MG tablet take 1 tablet by mouth once daily 90 tablet 3    escitalopram (LEXAPRO) 10 MG tablet take 1 tablet by mouth once daily 90 tablet 3    b complex vitamins capsule Take 1 capsule by mouth daily      Multiple Vitamins-Minerals (THERAPEUTIC MULTIVITAMIN-MINERALS) tablet Take 1 tablet by mouth daily      Calcium Carbonate-Vitamin D (CALCIUM-VITAMIN D) 500-200 MG-UNIT per tablet Take 1 tablet by mouth 2 times daily (with meals)      aspirin 325 MG EC tablet Take 325 mg by mouth daily       atorvastatin (LIPITOR) 20 MG tablet take 1 tablet by mouth once daily (Patient not taking: Reported on 6/14/2021) 90 tablet 3    fluticasone (FLONASE) 50 MCG/ACT nasal spray 1 spray by Nasal route daily (Patient not taking: Reported on 6/14/2021) 3 Bottle 3     No current facility-administered medications for this visit. She is allergic to bupropion and chantix [varenicline]. She  reports that she has quit smoking. Her smoking use included cigarettes. She smoked 2.00 packs per day. She has never used smokeless tobacco.    Review of Systems    Objective:    Vitals:    06/14/21 0937   BP: 134/64   Site: Left Upper Arm   Position: Sitting   Cuff Size: Large Adult   Pulse: 56   SpO2: 96%   Weight: 176 lb 8 oz (80.1 kg)     Body mass index is 26.06 kg/m². Well-nourished, well-developedhealthy weight elderly female, in no acute distress. Pupils are equal, round and reactive to light. Extra-ocular muscles are intact. Tympanic membranes and oropharynxare clear bilaterally. Neck is supple. There is no lymphadenopathy or thyromegaly. Breasts are symmetric. There are no dominant masses palpated bilaterally. There are no skin changes or nipple dischargebilaterally. There are no axillary lymph nodes palpable bilaterally. Chest is clear to auscultation bilaterally. Heart sounds are regular rate and rhythm without murmur. Abdomen is soft, non-tender,non-distended, with no masses. Genito-urinary:  External genitalia has no visible lesions,  vagina is normal, cervix has no visible lesions. Uterus and adnexa are non-tender and notenlarged. Rectal exam:unremarkable external exam, hemoccult not indicated  Lower extremities have no edema.     Labs done were reviewed with the patient:  Office Visit on 06/14/2021   Component Date Value Ref Range Status    Hemoglobin A1C 06/14/2021 5.8  % Final    Gynecology Cytology Report 06/14/2021 (NOTE)   Final    Comment: INTERPRETATION Cervical material, (ThinPrep vial, Imaging-assisted review):  Specimen Adequacy:      Satisfactory for evaluation. Descriptive Diagnosis:      Negative for intraepithelial lesion or malignancy. Cytotechnologist:   Marta THOMPSON(ASCP)  **Electronically Signed Out**  ey/6/17/2021  Source:  1: Cervical material, (ThinPrep vial, Imaging-assisted review)  Clinical History  Z01.419 Routine gyn exam without abnormal findings  High Risk HPV DNA testing is requested if the diagnosis is ASC-US  Date of prior pap: 12/23/2016 normal  GYNECOLOGIC CYTOLOGY REPORT  Patient Name: Deanna Arriola  Med Rec: [id number]  Path Number: [id number]  [facility] [address]   [place], [address]  [phone]  Fax: [phone]     Orders Only on 06/10/2021   Component Date Value Ref Range Status    Sodium 06/10/2021 140  135 - 145 mmol/L Final    Potassium 06/10/2021 4.4  3.6 - 5.0 mmol/L Final    Chloride 06/10/2021 105  98 - 120 mmol/L Final    CO2 06/10/2021 26  22 - 31 mmol/L Final    Anion Gap 06/10/2021 8.8  mmol/L Final    BUN 06/10/2021 14  7 - 17 mg/dL Final    CREATININE 06/10/2021 0.8  0.5 - 1.0 mg/dL Final    Gfr Calculated 06/10/2021 > 60.0   Final    Glucose 06/10/2021 105  65 - 105 mg/dL Final    Calcium 06/10/2021 9.5  8.4 - 10.2 mg/dL Final    Total Bilirubin 06/10/2021 0.5  0.2 - 1.3 mg/dL Final    AST 06/10/2021 28  14 - 36 units/L Final    ALT 06/10/2021 20  4 - 35 units/L Final    Total Protein, Serum 06/10/2021 7.4  6.3 - 8.2 g/dL Final    Albumin 06/10/2021 4.5  3.5 - 5.0 g/dL Final    Globulin 06/10/2021 2.8  g/dL Final    Albumin/Globulin Ratio 06/10/2021 1.6  g/dL Final    Alkaline Phosphatase 06/10/2021 77  38 - 126 units/L Final    Triglycerides 06/10/2021 147  10 - 250 mg/dL Final    Cholesterol 06/10/2021 279* 50 - 200 mg/dL Final    LDL Calculated 06/10/2021 197.6* 0.0 - 160.0 mg/dL Final    VLDL 06/10/2021 29  0 - 50 mg/dL Final    HDL 06/10/2021 52  36 - 68 mg/dL Final    Chol/HDL Ratio 06/10/2021 5.37* 0.0 - 4.50 ratio Final       Assessment and Plan:  Well female exam with routine gynecological exam  Pap smearwas obtained today using the Thin Prep method. She willbe contacted with results. Mammogram was ordered. She was advised to continue annual mammograms and physical exams, with a Paptest every 3 years. 1. Pap smear, as part of routine gynecological examination    - PAP SMEAR; Future    2. Impaired fasting blood sugar    - POCT glycosylated hemoglobin (Hb A1C)    3. Encounter for gynecological examination without abnormal finding      4. Menopause    - DEXA BONE DENSITY 2 SITES; Future    5. Visit for screening mammogram    - JENNIFER BRYSON DIGITAL SCREEN BILATERAL; Future    6. Need for prophylactic vaccination against Streptococcus pneumoniae (pneumococcus)    - Pneumococcal conjugate vaccine 13-valent    7. Mixed hyperlipidemia    - Lipid Panel; Future  - AST; Future  - ALT; Future    Routine health maintenance  Health maintenance wasreviewed with the patient. Lipid panel and fasting glucose were recommended and ordered. She has received an influenza vaccine this influenza season. Colon cancerscreening is up to date. Repeat testing is indicated in 2028    All other health maintenance is up to date at this time. Prevnar 13 administered and discussed pneumonia 23 next year. Encouraged to get the COVID-19 vaccine and her questions were answered.

## 2021-06-17 LAB — GYNECOLOGY CYTOLOGY REPORT: NORMAL

## 2021-06-24 DIAGNOSIS — Z78.0 MENOPAUSE: ICD-10-CM

## 2021-12-09 DIAGNOSIS — I10 ESSENTIAL HYPERTENSION: ICD-10-CM

## 2021-12-09 RX ORDER — AMLODIPINE BESYLATE 2.5 MG/1
TABLET ORAL
Qty: 90 TABLET | Refills: 1 | Status: SHIPPED | OUTPATIENT
Start: 2021-12-09 | End: 2022-06-13

## 2021-12-09 NOTE — TELEPHONE ENCOUNTER
Igor Childress is requesting a refill on the following medication(s):  Requested Prescriptions     Pending Prescriptions Disp Refills    amLODIPine (NORVASC) 2.5 MG tablet [Pharmacy Med Name: AMLODIPINE BESYLATE 2.5 MG TAB] 90 tablet 1     Sig: take 1 tablet by mouth once daily       Last Visit Date (If Applicable):  0/91/6825    Next Visit Date:    12/13/2021

## 2021-12-10 LAB
ALT SERPL-CCNC: 27 UNITS/L (ref 4–35)
AST SERPL-CCNC: 28 UNITS/L (ref 14–36)
CHOLESTEROL/HDL RATIO: 3.38 RATIO (ref 0–4.5)
CHOLESTEROL: 186 MG/DL (ref 50–200)
HDLC SERPL-MCNC: 55 MG/DL (ref 36–68)
LDL CHOLESTEROL CALCULATED: 109.2 MG/DL (ref 0–160)
TRIGL SERPL-MCNC: 109 MG/DL (ref 10–250)
VLDLC SERPL CALC-MCNC: 22 MG/DL (ref 0–50)

## 2021-12-13 ENCOUNTER — OFFICE VISIT (OUTPATIENT)
Dept: FAMILY MEDICINE CLINIC | Age: 66
End: 2021-12-13
Payer: COMMERCIAL

## 2021-12-13 VITALS
OXYGEN SATURATION: 95 % | SYSTOLIC BLOOD PRESSURE: 102 MMHG | BODY MASS INDEX: 26.88 KG/M2 | DIASTOLIC BLOOD PRESSURE: 52 MMHG | WEIGHT: 182 LBS | HEART RATE: 70 BPM

## 2021-12-13 DIAGNOSIS — I10 ESSENTIAL HYPERTENSION: Primary | ICD-10-CM

## 2021-12-13 DIAGNOSIS — Z87.891 PERSONAL HISTORY OF TOBACCO USE: ICD-10-CM

## 2021-12-13 DIAGNOSIS — K21.9 GASTROESOPHAGEAL REFLUX DISEASE WITHOUT ESOPHAGITIS: ICD-10-CM

## 2021-12-13 DIAGNOSIS — Z23 NEED FOR INFLUENZA VACCINATION: ICD-10-CM

## 2021-12-13 DIAGNOSIS — E78.2 MIXED HYPERLIPIDEMIA: ICD-10-CM

## 2021-12-13 DIAGNOSIS — F41.8 DEPRESSION WITH ANXIETY: ICD-10-CM

## 2021-12-13 PROCEDURE — G0296 VISIT TO DETERM LDCT ELIG: HCPCS | Performed by: FAMILY MEDICINE

## 2021-12-13 PROCEDURE — 90694 VACC AIIV4 NO PRSRV 0.5ML IM: CPT | Performed by: FAMILY MEDICINE

## 2021-12-13 PROCEDURE — 90471 IMMUNIZATION ADMIN: CPT | Performed by: FAMILY MEDICINE

## 2021-12-13 PROCEDURE — 99214 OFFICE O/P EST MOD 30 MIN: CPT | Performed by: FAMILY MEDICINE

## 2021-12-13 RX ORDER — LANOLIN ALCOHOL/MO/W.PET/CERES
1000 CREAM (GRAM) TOPICAL DAILY
COMMUNITY

## 2021-12-13 RX ORDER — ESCITALOPRAM OXALATE 10 MG/1
5 TABLET ORAL DAILY
Qty: 90 TABLET | Refills: 3
Start: 2021-12-13 | End: 2022-02-17

## 2021-12-13 ASSESSMENT — PATIENT HEALTH QUESTIONNAIRE - PHQ9
SUM OF ALL RESPONSES TO PHQ QUESTIONS 1-9: 0
SUM OF ALL RESPONSES TO PHQ QUESTIONS 1-9: 0
2. FEELING DOWN, DEPRESSED OR HOPELESS: 0
SUM OF ALL RESPONSES TO PHQ9 QUESTIONS 1 & 2: 0
SUM OF ALL RESPONSES TO PHQ QUESTIONS 1-9: 0
1. LITTLE INTEREST OR PLEASURE IN DOING THINGS: 0

## 2021-12-13 NOTE — PROGRESS NOTES
Have you had an allergic reaction to the flu (influenza) shot? no  Are you allergic to eggs or any component of the flu vaccine? no  Do you have a history of Guillain-[place] Syndrome (GBS), which is paralysis after receiving the flu vaccine? no  Are you feeling well today? yes  Flu vaccine given as ordered. Patient tolerated it well. No questions re: VIS information.

## 2021-12-13 NOTE — PATIENT INSTRUCTIONS
Cut the escitalopram down to 5 mg (1/2 of the tablet) to see if this helps with the fatigue/ tiredness. Watch for mood changes deniz this winter. Think about the COVID vaccine!! Watch the blood pressure closely-- let me know if it is running under 110/ 60 on a regular basis and I would consider stopping the amlodipine. What is lung cancer screening? Lung cancer screening is a way in which doctors check the lungs for early signs of cancer in people who have no symptoms of lung cancer. A low-dose CT scan uses much less radiation than a normal CT scan and shows a more detailed image of the lungs than a standard X-ray. The goal of lung cancer screening is to find cancer early, before it has a chance to grow, spread, or cause problems. One large study found that smokers who were screened with low-dose CT scans were less likely to die of lung cancer than those who were screened with standard X-ray. Below is a summary of the things you need to know regarding screening for lung cancer with low-dose computed tomography (LDCT). This is a screening program that involves routine annual screening with LDCT studies of the lung. The LDCTs are done using low-dose radiation that is not thought to increase your cancer risk. If you have other serious medical conditions (other cancers, congestive heart failure) that limit your life expectancy to less than 10 years, you should not undergo lung cancer screening with LDCT. The chance is 20%-60% that the LDCT result will show abnormalities. This would require additional testing which could include repeat imaging or even invasive procedures. Most (about 95%) of \"abnormal\" LDCT results are false in the sense that no lung cancer is ultimately found. Additionally, some (about 10%) of the cancers found would not affect your life expectancy, even if undetected and untreated.   If you are still smoking, the single most important thing that you can do to reduce your risk of dying of lung cancer is to quit. For this screening to be covered by Medicare and most other insurers, strict criteria must be met. If you do not meet these criteria, but still wish to undergo LDCT testing, you will be required to sign a waiver indicating your willingness to pay for the scan.

## 2021-12-13 NOTE — PROGRESS NOTES
[facility]  [address]  Dept: [phone]  Dept MRL:[phone]    Jonas Jacob is a 73 y.o. female who presents today for her medical conditions/complaints as notedbelow. Jonas Jacob is c/o of Blood Sugar Problem (6mo follow up) and Depression (follow up)      HPI:     HPI    Cholesterol is much better- back on her statin. Labs reviewed and doing fine on her medications. Reviewed labs today. Is still getting some exercise. Is starting to get some exercise but is not watching her diet. Is using her exercise machine daily and building endurance. BP has been low but has not checked it regularly. Is sleepy some times. Is able to sleep at the drop of a hat. Has energy ok to do things. No dizziness or lightheadedness. No CP/ no SOB. No palpitations. Sleeps really well. No anxiety. Mood has been good.  Not down or depressed    BP Readings from Last 3 Encounters:   12/13/21 (!) 102/52   06/14/21 134/64   12/22/20 97/77          (goal 120/80)    Wt Readings from Last 3 Encounters:   12/13/21 182 lb (82.6 kg)   06/14/21 176 lb 8 oz (80.1 kg)   12/22/20 186 lb 4.8 oz (84.5 kg)        Past Medical History:   Diagnosis Date    Depression with anxiety 8/24/2017    History of colon polyps     benign    Hyperlipidemia     Recurrent major depressive disorder, in full remission ([facility] 7/11/2018      Past Surgical History:   Procedure Laterality Date    COLONOSCOPY  08/2008    mynor many small polyps due 2018    SKIN CANCER EXCISION  04/2014    TONSILLECTOMY         Family History   Problem Relation Age of Onset    High Blood Pressure Mother     Heart Disease Mother     Diabetes Mother     Thyroid Disease Mother     Heart Surgery Father     Heart Disease Father     Thyroid Disease Sister     Diabetes Sister     Diabetes Brother        Social History     Tobacco Use    Smoking status: Former Smoker Packs/day: 2.00     Years: 39.00     Pack years: 78.00     Types: Cigarettes     Start date: 1/1/1972     Quit date: 2011     Years since quitting: 10.9    Smokeless tobacco: Never Used   Substance Use Topics    Alcohol use: Yes      Current Outpatient Medications   Medication Sig Dispense Refill    vitamin B-12 (CYANOCOBALAMIN) 1000 MCG tablet Take 1,000 mcg by mouth daily      Citicoline (COGNITIVE HEALTH PO) Take by mouth      escitalopram (LEXAPRO) 10 MG tablet Take 0.5 tablets by mouth daily take 1 tablet by mouth once daily 90 tablet 3    amLODIPine (NORVASC) 2.5 MG tablet take 1 tablet by mouth once daily 90 tablet 1    pantoprazole (PROTONIX) 40 MG tablet take 1 tablet by mouth once daily 90 tablet 3    atorvastatin (LIPITOR) 20 MG tablet take 1 tablet by mouth once daily 90 tablet 3    fluticasone (FLONASE) 50 MCG/ACT nasal spray 1 spray by Nasal route daily 3 Bottle 3    Multiple Vitamins-Minerals (THERAPEUTIC MULTIVITAMIN-MINERALS) tablet Take 1 tablet by mouth daily      Calcium Carbonate-Vitamin D (CALCIUM-VITAMIN D) 500-200 MG-UNIT per tablet Take 1 tablet by mouth 2 times daily (with meals)      aspirin 325 MG EC tablet Take 325 mg by mouth daily        No current facility-administered medications for this visit.      Allergies   Allergen Reactions    Bupropion Other (See Comments)     irritable    Chantix [Varenicline]      Irritable         Health Maintenance   Topic Date Due    COVID-19 Vaccine (1) Never done    HIV screen  Never done    Low dose CT lung screening  Never done    Shingles Vaccine (2 of 3) 12/09/2017    A1C test (Diabetic or Prediabetic)  06/14/2022    Pneumococcal 65+ years Vaccine (2 of 2 - PPSV23) 06/14/2022    Lipid screen  12/10/2022    Breast cancer screen  06/22/2023    Cervical cancer screen  06/14/2024    Colon cancer screen colonoscopy  07/30/2028    DTaP/Tdap/Td vaccine (3 - Td or Tdap) 06/05/2030    DEXA (modify frequency per FRAX score) Completed    Flu vaccine  Completed    Hepatitis C screen  Completed    Hepatitis A vaccine  Aged Out    Hepatitis B vaccine  Aged Out    Hib vaccine  Aged Out    Meningococcal (ACWY) vaccine  Aged Out       Subjective:      Review of Systems    Objective:     BP (!) 102/52 (Site: Left Upper Arm, Position: Sitting, Cuff Size: Large Adult)   Pulse 70   Wt 182 lb (82.6 kg)   SpO2 95%   BMI 26.88 kg/m²     Physical Exam  Vitals and nursing note reviewed. Constitutional:       Appearance: Normal appearance. She is well-developed. HENT:      Head: Normocephalic and atraumatic. Eyes:      Conjunctiva/sclera: Conjunctivae normal.   Neck:      Thyroid: No thyromegaly. Vascular: No JVD. Cardiovascular:      Rate and Rhythm: Normal rate and regular rhythm. Heart sounds: Normal heart sounds. No murmur heard. No friction rub. No gallop. Pulmonary:      Effort: Pulmonary effort is normal. No respiratory distress. Breath sounds: Normal breath sounds. Musculoskeletal:      Cervical back: Normal range of motion and neck supple. Right lower leg: No edema. Left lower leg: No edema. Lymphadenopathy:      Cervical: No cervical adenopathy. Skin:     General: Skin is warm. Capillary Refill: Capillary refill takes less than 2 seconds. Neurological:      General: No focal deficit present. Mental Status: She is alert and oriented to person, place, and time. Psychiatric:         Mood and Affect: Mood normal.         Behavior: Behavior normal.         Thought Content: Thought content normal.         Judgment: Judgment normal.         Assessment/Plan:     1. Essential hypertension  2. Depression with anxiety  -     escitalopram (LEXAPRO) 10 MG tablet; Take 0.5 tablets by mouth daily take 1 tablet by mouth once daily, Disp-90 tablet, R-3Adjust Sig  3.  Need for influenza vaccination  -     INFLUENZA, QUADV, ADJUVANTED, 65 YRS =, IM, PF, PREFILL SYR, 0.5ML (FLUAD)  4. Personal history of tobacco use  -     MT VISIT TO DISCUSS LUNG CA SCREEN W LDCT  -     CT Lung Screen (Annual); Future  5. Gastroesophageal reflux disease without esophagitis  6. Mixed hyperlipidemia    Cut the escitalopram down to 5 mg (1/2 of the tablet) to see if this helps with the fatigue/ tiredness. Watch for mood changes deniz this winter    Think about the COVID vaccine!! Watch the blood pressure closely-- let me know if it is running under 110/ 60 on a regular basis and I would consider stopping the amlodipine. Hyperlipidemia is asymptomatic. No changes in her current medications. Lab Results   Component Value Date    WBC 4.8 12/10/2020    HGB 12.8 12/10/2020    HCT 38.8 12/10/2020     12/10/2020    CHOL 186 12/10/2021    TRIG 109 12/10/2021    HDL 55 12/10/2021    ALT 27 12/10/2021    AST 28 12/10/2021     06/10/2021    K 4.4 06/10/2021     06/10/2021    CREATININE 0.8 06/10/2021    BUN 14 06/10/2021    CO2 26 06/10/2021    GLUF 101 01/09/2018    LABA1C 5.8 06/14/2021    LABA1C 6.2 06/05/2020       Return in about 6 months (around 6/13/2022) for HTN. Multiple labs and other testing may have been ordered which may not be completely evident from the above note due to system interface incompatibilities. Patient given educational materials - see patientinstructions. Discussed use, benefit, and side effects of prescribed medications. All patient questions answered. Pt voiced understanding. Reviewed health maintenance. Instructed to continue current medications, diet andexercise. Patient agreed with treatment plan. Follow up as directed.      (Please note that portions of this note were completed with a voice-recognition program. Efforts were made to edit the dictation but occasionally words are mis-transcribed.)    Electronically signed by Bud Fuller MD on 12/18/2021   Low Dose CT (LDCT) Lung Screening criteria met:     Age 55-77(Medicare) or 50-80 (USPST)   Pack year smoking >30 (Medicare) or >20 (USPSTF)   Still smoking or less than 15 year since quit   No sign or symptoms of lung cancer   > 11 months since last LDCT     Risks and benefits of lung cancer screening with LDCT scans discussed:    Significance of positive screen - False-positive LDCT results often occur. 95% of all positive results do not lead to a diagnosis of cancer. Usually further imaging can resolve most false-positive results; however, some patients may require invasive procedures. Over diagnosis risk - 10% to 12% of screen-detected lung cancer cases are over diagnosedthat is, the cancer would not have been detected in the patient's lifetime without the screening. Need for follow up screens annually to continue lung cancer screening effectiveness     Risks associated with radiation from annual LDCT- Radiation exposure is about the same as for a mammogram, which is about 1/3 of the annual background radiation exposure from everyday life. Starting screening at age 54 is not likely to increase cancer risk from radiation exposure. Patients with comorbidities resulting in life expectancy of < 10 years, or that would preclude treatment of an abnormality identified on CT, should not be screened due to lack of benefit.     To obtain maximal benefit from this screening, smoking cessation and long-term abstinence from smoking is critical

## 2021-12-30 ENCOUNTER — TELEPHONE (OUTPATIENT)
Dept: FAMILY MEDICINE CLINIC | Age: 66
End: 2021-12-30

## 2021-12-30 DIAGNOSIS — R91.1 SOLITARY LUNG NODULE: ICD-10-CM

## 2021-12-30 DIAGNOSIS — R93.89 ABNORMAL CHEST CT: Primary | ICD-10-CM

## 2021-12-31 NOTE — TELEPHONE ENCOUNTER
Low-dose CT showed 9 mm which is thought to be benign which is noncancerous. Usual cause of this is a scar. A repeat in 3 months is recommended to ensure stability. Orders placed. Please let patient know.

## 2022-02-16 DIAGNOSIS — K21.9 GASTROESOPHAGEAL REFLUX DISEASE WITHOUT ESOPHAGITIS: ICD-10-CM

## 2022-02-16 DIAGNOSIS — F41.8 DEPRESSION WITH ANXIETY: ICD-10-CM

## 2022-02-17 RX ORDER — ATORVASTATIN CALCIUM 20 MG/1
TABLET, FILM COATED ORAL
Qty: 90 TABLET | Refills: 3 | Status: SHIPPED | OUTPATIENT
Start: 2022-02-17

## 2022-02-17 RX ORDER — PANTOPRAZOLE SODIUM 40 MG/1
TABLET, DELAYED RELEASE ORAL
Qty: 90 TABLET | Refills: 3 | Status: SHIPPED | OUTPATIENT
Start: 2022-02-17

## 2022-02-17 RX ORDER — ESCITALOPRAM OXALATE 10 MG/1
TABLET ORAL
Qty: 90 TABLET | Refills: 3 | Status: SHIPPED | OUTPATIENT
Start: 2022-02-17 | End: 2022-06-13 | Stop reason: DRUGHIGH

## 2022-04-08 DIAGNOSIS — R91.1 NODULE OF UPPER LOBE OF RIGHT LUNG: Primary | ICD-10-CM

## 2022-06-13 ENCOUNTER — OFFICE VISIT (OUTPATIENT)
Dept: FAMILY MEDICINE CLINIC | Age: 67
End: 2022-06-13
Payer: COMMERCIAL

## 2022-06-13 VITALS
SYSTOLIC BLOOD PRESSURE: 122 MMHG | HEART RATE: 59 BPM | BODY MASS INDEX: 27.7 KG/M2 | HEIGHT: 69 IN | WEIGHT: 187 LBS | OXYGEN SATURATION: 96 % | DIASTOLIC BLOOD PRESSURE: 66 MMHG

## 2022-06-13 DIAGNOSIS — R73.01 IFG (IMPAIRED FASTING GLUCOSE): ICD-10-CM

## 2022-06-13 DIAGNOSIS — I10 ESSENTIAL HYPERTENSION: ICD-10-CM

## 2022-06-13 DIAGNOSIS — R91.1 INCIDENTAL LUNG NODULE, GREATER THAN OR EQUAL TO 8MM: ICD-10-CM

## 2022-06-13 DIAGNOSIS — Z51.81 MEDICATION MONITORING ENCOUNTER: ICD-10-CM

## 2022-06-13 DIAGNOSIS — J43.1 PANLOBULAR EMPHYSEMA (HCC): ICD-10-CM

## 2022-06-13 DIAGNOSIS — E78.2 MIXED HYPERLIPIDEMIA: ICD-10-CM

## 2022-06-13 DIAGNOSIS — F33.42 RECURRENT MAJOR DEPRESSIVE DISORDER, IN FULL REMISSION (HCC): Primary | ICD-10-CM

## 2022-06-13 DIAGNOSIS — I42.8 NON-ISCHEMIC CARDIOMYOPATHY (HCC): ICD-10-CM

## 2022-06-13 PROCEDURE — 1123F ACP DISCUSS/DSCN MKR DOCD: CPT | Performed by: FAMILY MEDICINE

## 2022-06-13 PROCEDURE — 99214 OFFICE O/P EST MOD 30 MIN: CPT | Performed by: FAMILY MEDICINE

## 2022-06-13 RX ORDER — AMLODIPINE BESYLATE 2.5 MG/1
TABLET ORAL
Qty: 90 TABLET | Refills: 3 | Status: SHIPPED | OUTPATIENT
Start: 2022-06-13

## 2022-06-13 RX ORDER — ESCITALOPRAM OXALATE 5 MG/1
5 TABLET ORAL DAILY
Qty: 90 TABLET | Refills: 1
Start: 2022-06-13

## 2022-06-13 ASSESSMENT — PATIENT HEALTH QUESTIONNAIRE - PHQ9
3. TROUBLE FALLING OR STAYING ASLEEP: 0
1. LITTLE INTEREST OR PLEASURE IN DOING THINGS: 0
SUM OF ALL RESPONSES TO PHQ QUESTIONS 1-9: 3
8. MOVING OR SPEAKING SO SLOWLY THAT OTHER PEOPLE COULD HAVE NOTICED. OR THE OPPOSITE, BEING SO FIGETY OR RESTLESS THAT YOU HAVE BEEN MOVING AROUND A LOT MORE THAN USUAL: 0
6. FEELING BAD ABOUT YOURSELF - OR THAT YOU ARE A FAILURE OR HAVE LET YOURSELF OR YOUR FAMILY DOWN: 0
SUM OF ALL RESPONSES TO PHQ QUESTIONS 1-9: 3
SUM OF ALL RESPONSES TO PHQ9 QUESTIONS 1 & 2: 0
10. IF YOU CHECKED OFF ANY PROBLEMS, HOW DIFFICULT HAVE THESE PROBLEMS MADE IT FOR YOU TO DO YOUR WORK, TAKE CARE OF THINGS AT HOME, OR GET ALONG WITH OTHER PEOPLE: 0
5. POOR APPETITE OR OVEREATING: 0
2. FEELING DOWN, DEPRESSED OR HOPELESS: 0
4. FEELING TIRED OR HAVING LITTLE ENERGY: 3
9. THOUGHTS THAT YOU WOULD BE BETTER OFF DEAD, OR OF HURTING YOURSELF: 0
SUM OF ALL RESPONSES TO PHQ QUESTIONS 1-9: 3
7. TROUBLE CONCENTRATING ON THINGS, SUCH AS READING THE NEWSPAPER OR WATCHING TELEVISION: 0
SUM OF ALL RESPONSES TO PHQ QUESTIONS 1-9: 3

## 2022-06-13 NOTE — PROGRESS NOTES
Lung nodule    [facility]  [address]  Dept: [phone]  Dept ELS:[phone]    Ashley Hurst is a 78 y.o. female who presents today for her medical conditions/complaints as notedbelow. Ashley Hurst is c/o of Hypertension (6mo follow up)      HPI:     HPI     At Suus's last visit we decreased her escitalopram to 5 mg daily. This was to help with the sleepiness. Is doing fine with the change-- cannot really tell if she is less sleepy than she was. Mood is still fine. Stress is about the see as it was--  aggravates at times. She was having some lower blood pressures at that point. Since that time no episodes of the BP dropping. Exercising regularly-- no CP/ noSOB/ no palpitations. No lower extremity edema. Longstanding for cancer in December showed incidental nodule in the right upper lobe. 9 mm. Repeat 3 months later showed stability. Recommendation for 6-month follow-up given. This will be ordered at her next visit. Reviewed findings. Also showed moderate emphysema. This was also reviewed at length with the patient today. She is asymptomatic. She does not have cough. She is not smoking. She does not have wheezing or shortness of breath.     BP Readings from Last 3 Encounters:   06/13/22 122/66   12/13/21 (!) 102/52   06/14/21 134/64          (goal 120/80)    Wt Readings from Last 3 Encounters:   06/13/22 187 lb (84.8 kg)   12/13/21 182 lb (82.6 kg)   06/14/21 176 lb 8 oz (80.1 kg)        Past Medical History:   Diagnosis Date    Depression with anxiety 8/24/2017    History of colon polyps     benign    Hyperlipidemia     Incidental lung nodule, greater than or equal to 8mm 6/13/2022    Panlobular emphysema ([address] 6/13/2022    Recurrent major depressive disorder, in full remission ([address] 7/11/2018      Past Surgical History:   Procedure Laterality Date    COLONOSCOPY  08/2008    mynor many small polyps due 2018    SKIN CANCER EXCISION  2014    TONSILLECTOMY         Family History   Problem Relation Age of Onset    High Blood Pressure Mother     Heart Disease Mother     Diabetes Mother     Thyroid Disease Mother     Heart Surgery Father     Heart Disease Father     Thyroid Disease Sister     Diabetes Sister     Diabetes Brother        Social History     Tobacco Use    Smoking status: Former Smoker     Packs/day: 2.00     Years: 39.00     Pack years: 78.00     Types: Cigarettes     Start date: 1972     Quit date:      Years since quittin.4    Smokeless tobacco: Never Used   Substance Use Topics    Alcohol use: Yes      Current Outpatient Medications   Medication Sig Dispense Refill    escitalopram (LEXAPRO) 5 MG tablet Take 1 tablet by mouth daily 90 tablet 1    atorvastatin (LIPITOR) 20 MG tablet take 1 tablet by mouth once daily 90 tablet 3    pantoprazole (PROTONIX) 40 MG tablet take 1 tablet by mouth once daily 90 tablet 3    vitamin B-12 (CYANOCOBALAMIN) 1000 MCG tablet Take 1,000 mcg by mouth daily      Citicoline (COGNITIVE HEALTH PO) Take by mouth      amLODIPine (NORVASC) 2.5 MG tablet take 1 tablet by mouth once daily 90 tablet 1    fluticasone (FLONASE) 50 MCG/ACT nasal spray 1 spray by Nasal route daily 3 Bottle 3    Multiple Vitamins-Minerals (THERAPEUTIC MULTIVITAMIN-MINERALS) tablet Take 1 tablet by mouth daily      Calcium Carbonate-Vitamin D (CALCIUM-VITAMIN D) 500-200 MG-UNIT per tablet Take 1 tablet by mouth 2 times daily (with meals)      aspirin 325 MG EC tablet Take 325 mg by mouth daily        No current facility-administered medications for this visit.      Allergies   Allergen Reactions    Bupropion Other (See Comments)     irritable    Chantix [Varenicline]      Irritable         Health Maintenance   Topic Date Due    COVID-19 Vaccine (1) Never done    Shingles vaccine (2 of 3) 2017    A1C test (Diabetic or Prediabetic)  2022  Pneumococcal 65+ years Vaccine (3 - PPSV23 or PCV20) 06/14/2022    Lipids  12/10/2022    Depression Monitoring  12/13/2022    Low dose CT lung screening  04/08/2023    Breast cancer screen  06/22/2023    Colorectal Cancer Screen  07/30/2028    DTaP/Tdap/Td vaccine (3 - Td or Tdap) 06/05/2030    DEXA (modify frequency per FRAX score)  Completed    Flu vaccine  Completed    Hepatitis C screen  Completed    Hepatitis A vaccine  Aged Out    Hepatitis B vaccine  Aged Out    Hib vaccine  Aged Out    Meningococcal (ACWY) vaccine  Aged Out       Subjective:      Review of Systems    Objective:     /66 (Site: Left Upper Arm, Position: Sitting, Cuff Size: Large Adult)   Pulse 59   Ht 5' 9\" (1.753 m)   Wt 187 lb (84.8 kg)   SpO2 96%   BMI 27.62 kg/m²     Physical Exam  Vitals and nursing note reviewed. Constitutional:       Appearance: Normal appearance. She is well-developed. HENT:      Head: Normocephalic and atraumatic. Eyes:      Conjunctiva/sclera: Conjunctivae normal.   Neck:      Thyroid: No thyromegaly. Vascular: No JVD. Cardiovascular:      Rate and Rhythm: Normal rate and regular rhythm. Heart sounds: Normal heart sounds. No murmur heard. No friction rub. No gallop. Pulmonary:      Effort: Pulmonary effort is normal. No respiratory distress. Breath sounds: Normal breath sounds. Musculoskeletal:      Cervical back: Normal range of motion and neck supple. Right lower leg: No edema. Left lower leg: No edema. Lymphadenopathy:      Cervical: No cervical adenopathy. Skin:     General: Skin is warm. Capillary Refill: Capillary refill takes less than 2 seconds. Neurological:      General: No focal deficit present. Mental Status: She is alert and oriented to person, place, and time. Psychiatric:         Mood and Affect: Mood normal.         Behavior: Behavior normal.         Thought Content:  Thought content normal.         Judgment: Judgment normal.         Assessment/Plan:     1. Recurrent major depressive disorder, in full remission ([facility]  -     escitalopram (LEXAPRO) 5 MG tablet; Take 1 tablet by mouth daily, Disp-90 tablet, R-1Adjust Sig  2. Non-ischemic cardiomyopathy ([facility]  3. IFG (impaired fasting glucose)  -     Lipid Panel; Future  4. Mixed hyperlipidemia  -     Comprehensive Metabolic Panel; Future  -     Lipid Panel; Future  5. Essential hypertension  -     Comprehensive Metabolic Panel; Future  6. Medication monitoring encounter  -     Vitamin B12; Future  7. Incidental lung nodule, greater than or equal to 8mm  8. [facility]ob[facility] ([facility]    Doing well on the 5 mg of the Lexapro. At this point with the ongoing stressors particularly with the stress with her  would continue on the 5 mg dosage. Could consider weaning off of this in the future. She is asymptomatic with her history of nonischemic cardiomyopathy. Continue aggressive risk factor modification including treatment for her hyperlipidemia which is also asymptomatic and her hypertension. GERD is well controlled at this time with the pantoprazole. Check B12 level to monitor for absorption issues with the ongoing usage. Avoid smoking cessation as above. Make sure pneumonia vaccine is updated and did review COVID-vaccine with her again today. Lab Results   Component Value Date    WBC 4.8 12/10/2020    HGB 12.8 12/10/2020    HCT 38.8 12/10/2020     12/10/2020    CHOL 186 12/10/2021    TRIG 109 12/10/2021    HDL 55 12/10/2021    ALT 27 12/10/2021    AST 28 12/10/2021     06/10/2021    K 4.4 06/10/2021     06/10/2021    CREATININE 0.8 06/10/2021    BUN 14 06/10/2021    CO2 26 06/10/2021    GLUF 101 01/09/2018    LABA1C 5.8 06/14/2021    LABA1C 6.2 06/05/2020       Return in about 6 months (around 12/13/2022) for Medication recheck.               Multiple labs and other testing may have been ordered which may not be completely evident from the above note due to system interface incompatibilities. Patient given educational materials - see patientinstructions. Discussed use, benefit, and side effects of prescribed medications. All patient questions answered. Pt voiced understanding. Reviewed health maintenance. Instructed to continue current medications, diet andexercise. Patient agreed with treatment plan. Follow up as directed.      (Please note that portions of this note were completed with a voice-recognition program. Efforts were made to edit the dictation but occasionally words are mis-transcribed.)    Electronically signed by Lesley Solares MD on 6/13/2022

## 2022-06-13 NOTE — TELEPHONE ENCOUNTER
Glo Blank is requesting a refill on the following medication(s):  Requested Prescriptions     Pending Prescriptions Disp Refills    amLODIPine (NORVASC) 2.5 MG tablet [Pharmacy Med Name: AMLODIPINE BESYLATE 2.5 MG TAB] 90 tablet 1     Sig: take 1 tablet by mouth once daily       Last Visit Date (If Applicable):  80/47/2932    Next Visit Date:    6/13/2022

## 2022-08-11 ENCOUNTER — OFFICE VISIT (OUTPATIENT)
Dept: FAMILY MEDICINE CLINIC | Age: 67
End: 2022-08-11
Payer: COMMERCIAL

## 2022-08-11 VITALS
OXYGEN SATURATION: 97 % | SYSTOLIC BLOOD PRESSURE: 132 MMHG | DIASTOLIC BLOOD PRESSURE: 70 MMHG | HEART RATE: 62 BPM | WEIGHT: 189 LBS | BODY MASS INDEX: 27.91 KG/M2

## 2022-08-11 DIAGNOSIS — Z23 NEED FOR PROPHYLACTIC VACCINATION AGAINST STREPTOCOCCUS PNEUMONIAE (PNEUMOCOCCUS): ICD-10-CM

## 2022-08-11 DIAGNOSIS — M54.2 NECK PAIN: ICD-10-CM

## 2022-08-11 DIAGNOSIS — R29.898 NECK TIGHTNESS: Primary | ICD-10-CM

## 2022-08-11 PROCEDURE — 1123F ACP DISCUSS/DSCN MKR DOCD: CPT | Performed by: FAMILY MEDICINE

## 2022-08-11 PROCEDURE — 90677 PCV20 VACCINE IM: CPT | Performed by: FAMILY MEDICINE

## 2022-08-11 PROCEDURE — 99213 OFFICE O/P EST LOW 20 MIN: CPT | Performed by: FAMILY MEDICINE

## 2022-08-11 PROCEDURE — 90471 IMMUNIZATION ADMIN: CPT | Performed by: FAMILY MEDICINE

## 2022-08-11 RX ORDER — TIZANIDINE 4 MG/1
4 TABLET ORAL EVERY 6 HOURS PRN
Qty: 10 TABLET | Refills: 0 | Status: SHIPPED | OUTPATIENT
Start: 2022-08-11

## 2022-08-11 RX ORDER — PREDNISONE 20 MG/1
20 TABLET ORAL DAILY
Qty: 5 TABLET | Refills: 0 | Status: SHIPPED | OUTPATIENT
Start: 2022-08-11 | End: 2022-08-16

## 2022-08-11 SDOH — ECONOMIC STABILITY: FOOD INSECURITY: WITHIN THE PAST 12 MONTHS, THE FOOD YOU BOUGHT JUST DIDN'T LAST AND YOU DIDN'T HAVE MONEY TO GET MORE.: NEVER TRUE

## 2022-08-11 SDOH — ECONOMIC STABILITY: FOOD INSECURITY: WITHIN THE PAST 12 MONTHS, YOU WORRIED THAT YOUR FOOD WOULD RUN OUT BEFORE YOU GOT MONEY TO BUY MORE.: NEVER TRUE

## 2022-08-11 ASSESSMENT — SOCIAL DETERMINANTS OF HEALTH (SDOH): HOW HARD IS IT FOR YOU TO PAY FOR THE VERY BASICS LIKE FOOD, HOUSING, MEDICAL CARE, AND HEATING?: NOT HARD AT ALL

## 2022-08-11 NOTE — PROGRESS NOTES
[address] [facility]  [address]  Dept: [phone]  Dept CIS:[phone]    Bon Boone is a 78 y.o. female who presents today for her medical conditions/complaints as notedbelow. Bon Boone is c/o of Neck Pain (Neck pain and tight muscles in in her neck and shoulders. Has been going on for 3-4 weeks. Interfering with her sleep. )        Assessment/Plan:     1. Neck tightness  -     tiZANidine (ZANAFLEX) 4 MG tablet; Take 1 tablet by mouth every 6 hours as needed (neck tightness), Disp-10 tablet, R-0Normal  -     predniSONE (DELTASONE) 20 MG tablet; Take 1 tablet by mouth in the morning for 5 days. , Disp-5 tablet, R-0Normal  2. Neck pain  -     tiZANidine (ZANAFLEX) 4 MG tablet; Take 1 tablet by mouth every 6 hours as needed (neck tightness), Disp-10 tablet, R-0Normal  -     predniSONE (DELTASONE) 20 MG tablet; Take 1 tablet by mouth in the morning for 5 days. , Disp-5 tablet, R-0Normal  3. Need for prophylactic vaccination against Streptococcus pneumoniae (pneumococcus)  -     Pneumococcal, PCV20, PREVNAR 21, (age 25 yrs+), IM, PF    Home exercise program with the next exercises reviewed. Try the prednisone tizanidine watching for sleepiness and lightheadedness and the short course of the steroids taken in the morning with food. If symptoms are not improving first part of next week would refer to occupational therapy for massage exercises and dry needling.       Lab Results   Component Value Date    WBC 4.8 12/10/2020    HGB 12.8 12/10/2020    HCT 38.8 12/10/2020     12/10/2020    CHOL 186 12/10/2021    TRIG 109 12/10/2021    HDL 55 12/10/2021    ALT 27 12/10/2021    AST 28 12/10/2021     06/10/2021    K 4.4 06/10/2021     06/10/2021    CREATININE 0.8 06/10/2021    BUN 14 06/10/2021    CO2 26 06/10/2021    GLUF 101 01/09/2018    LABA1C 5.8 06/14/2021    LABA1C 6.2 06/05/2020       Return if symptoms worsen or fail to improve. Subjective:      HPI:     HPI    Did change pillows and thinks this triggered it a few weeks ago. More on the right -- no pain in the arm. No numbness or tingling or weakness, hard to turn to the right all the way. Occasional minor HA. Along the back of the head on her head-- feels like needs the neck stretched. BP Readings from Last 3 Encounters:   22 132/70   22 122/66   21 (!) 102/52          (goal 120/80)    Wt Readings from Last 3 Encounters:   22 189 lb (85.7 kg)   22 187 lb (84.8 kg)   21 182 lb (82.6 kg)        Past Medical History:   Diagnosis Date    Depression with anxiety 2017    History of colon polyps     benign    Hyperlipidemia     Incidental lung nodule, greater than or equal to 8mm 2022    Panlobular emphysema ([facility] [address] 2022    Recurrent major depressive disorder, in full remission ([facility] [address] 2018      Past Surgical History:   Procedure Laterality Date    COLONOSCOPY  2008    mynor many small polyps due 2018    SKIN CANCER EXCISION  2014    TONSILLECTOMY         Family History   Problem Relation Age of Onset    High Blood Pressure Mother     Heart Disease Mother     Diabetes Mother     Thyroid Disease Mother     Heart Surgery Father     Heart Disease Father     Thyroid Disease Sister     Diabetes Sister     Diabetes Brother        Social History     Tobacco Use    Smoking status: Former     Packs/day: 2.00     Years: 39.00     Pack years: 78.00     Types: Cigarettes     Start date: 1972     Quit date:      Years since quittin.6    Smokeless tobacco: Never   Substance Use Topics    Alcohol use: Yes      Current Outpatient Medications   Medication Sig Dispense Refill    tiZANidine (ZANAFLEX) 4 MG tablet Take 1 tablet by mouth every 6 hours as needed (neck tightness) 10 tablet 0    predniSONE (DELTASONE) 20 MG tablet Take 1 tablet by mouth in the morning for 5 days.  5 tablet 0    amLODIPine (NORVASC) 2.5 MG tablet take 1 tablet by mouth once daily 90 tablet 3    escitalopram (LEXAPRO) 5 MG tablet Take 1 tablet by mouth daily 90 tablet 1    atorvastatin (LIPITOR) 20 MG tablet take 1 tablet by mouth once daily 90 tablet 3    pantoprazole (PROTONIX) 40 MG tablet take 1 tablet by mouth once daily 90 tablet 3    vitamin B-12 (CYANOCOBALAMIN) 1000 MCG tablet Take 1,000 mcg by mouth daily      Citicoline (COGNITIVE HEALTH PO) Take by mouth      fluticasone (FLONASE) 50 MCG/ACT nasal spray 1 spray by Nasal route daily 3 Bottle 3    Multiple Vitamins-Minerals (THERAPEUTIC MULTIVITAMIN-MINERALS) tablet Take 1 tablet by mouth daily      Calcium Carbonate-Vitamin D (CALCIUM-VITAMIN D) 500-200 MG-UNIT per tablet Take 1 tablet by mouth 2 times daily (with meals)      aspirin 325 MG EC tablet Take 325 mg by mouth in the morning. No current facility-administered medications for this visit. Allergies   Allergen Reactions    Bupropion Other (See Comments)     irritable    Chantix [Varenicline]      Irritable         Health Maintenance   Topic Date Due    COVID-19 Vaccine (1) Never done    Shingles vaccine (2 of 3) 12/09/2017    A1C test (Diabetic or Prediabetic)  06/14/2022    Flu vaccine (1) 09/01/2022    Lipids  12/10/2022    Low dose CT lung screening  04/08/2023    Depression Monitoring  06/13/2023    Breast cancer screen  06/22/2023    Colorectal Cancer Screen  07/30/2028    DTaP/Tdap/Td vaccine (3 - Td or Tdap) 06/05/2030    DEXA (modify frequency per FRAX score)  Completed    Pneumococcal 65+ years Vaccine  Completed    Hepatitis C screen  Completed    Hepatitis A vaccine  Aged Out    Hepatitis B vaccine  Aged Out    Hib vaccine  Aged Out    Meningococcal (ACWY) vaccine  Aged Out         Review of Systems    Objective:     /70 (Site: Left Upper Arm, Position: Sitting, Cuff Size: Large Adult)   Pulse 62   Wt 189 lb (85.7 kg)   SpO2 97%   BMI 27.91 kg/m²     Physical Exam  Vitals and nursing note reviewed. Constitutional:       Appearance: Normal appearance. HENT:      Head: Normocephalic. Right Ear: External ear normal.      Left Ear: External ear normal.      Mouth/Throat:      Mouth: Mucous membranes are moist.   Eyes:      Conjunctiva/sclera: Conjunctivae normal.      Pupils: Pupils are equal, round, and reactive to light. Neck:     Cardiovascular:      Rate and Rhythm: Normal rate. Pulmonary:      Effort: Pulmonary effort is normal.   Musculoskeletal:      Cervical back: Neck supple. Tenderness present. No rigidity. Lymphadenopathy:      Cervical: No cervical adenopathy. Neurological:      Mental Status: She is alert. Multiple labs and other testing may have been ordered which may not be completely evident from the above note due to system interface incompatibilities. Patient given educational materials - see patientinstructions. Discussed use, benefit, and side effects of prescribed medications. All patient questions answered. Pt voiced understanding. Reviewed health maintenance. Instructed to continue current medications, diet andexercise. Patient agreed with treatment plan. Follow up as directed.      (Please note that portions of this note were completed with a voice-recognition program. Efforts were made to edit the dictation but occasionally words are mis-transcribed.)    Electronically signed by Shanika Thakkar MD on 8/14/2022

## 2022-08-17 ENCOUNTER — TELEPHONE (OUTPATIENT)
Dept: FAMILY MEDICINE CLINIC | Age: 67
End: 2022-08-17

## 2022-08-19 NOTE — TELEPHONE ENCOUNTER
Pt notified by phone-will continue at home a little while longer and if still not 100% will let us know if needs OT

## 2022-08-26 ENCOUNTER — TELEPHONE (OUTPATIENT)
Dept: FAMILY MEDICINE CLINIC | Age: 67
End: 2022-08-26

## 2022-08-26 DIAGNOSIS — R29.898 NECK TIGHTNESS: Primary | ICD-10-CM

## 2022-08-26 DIAGNOSIS — M54.2 NECK PAIN: ICD-10-CM

## 2022-08-26 NOTE — TELEPHONE ENCOUNTER
Patient called in to report that she has waited a week and reports that her neck is not feeling any better and would like to go ahead with referral for massage therapy.

## 2022-10-06 ENCOUNTER — TELEPHONE (OUTPATIENT)
Dept: FAMILY MEDICINE CLINIC | Age: 67
End: 2022-10-06

## 2022-10-06 DIAGNOSIS — R91.8 MULTIPLE LUNG NODULES ON CT: Primary | ICD-10-CM

## 2022-10-06 NOTE — TELEPHONE ENCOUNTER
Please let Leonid Pmopa know that her lung cancer screening CT showed emphysema in the lungs but it also showed multiple nodules in both lungs which could be related to scarring from the emphysema but it is not clear that these are not worrisome. I would like her to have a PET scan which is another type of x-ray scan of her whole body to make sure that these are just scarring. Order has been put in.

## 2022-10-18 ENCOUNTER — TELEPHONE (OUTPATIENT)
Dept: FAMILY MEDICINE CLINIC | Age: 67
End: 2022-10-18

## 2022-10-18 DIAGNOSIS — R91.8 MULTIPLE LUNG NODULES ON CT: Primary | ICD-10-CM

## 2022-10-18 NOTE — TELEPHONE ENCOUNTER
Pet scan results showed determinate uptake. This is thought to be scar tissue but they could not say for sure. A repeat pet scan was recommended in 6 months. Please let the patient know and we will get this reordered.

## 2023-02-15 DIAGNOSIS — J30.89 CHRONIC NONSEASONAL ALLERGIC RHINITIS DUE TO POLLEN: ICD-10-CM

## 2023-02-15 RX ORDER — FLUTICASONE PROPIONATE 50 MCG
1 SPRAY, SUSPENSION (ML) NASAL DAILY
Qty: 3 EACH | Refills: 3 | Status: SHIPPED | OUTPATIENT
Start: 2023-02-15

## 2023-02-15 NOTE — TELEPHONE ENCOUNTER
Jamaal Marsh is requesting a refill on the following medication(s):  Requested Prescriptions     Pending Prescriptions Disp Refills    fluticasone (FLONASE) 50 MCG/ACT nasal spray 3 each 3     Si spray by Nasal route daily       Last Visit Date (If Applicable):      Next Visit Date:    Visit date not found

## 2023-02-21 DIAGNOSIS — K21.9 GASTROESOPHAGEAL REFLUX DISEASE WITHOUT ESOPHAGITIS: ICD-10-CM

## 2023-02-21 RX ORDER — PANTOPRAZOLE SODIUM 40 MG/1
TABLET, DELAYED RELEASE ORAL
Qty: 90 TABLET | Refills: 3 | Status: SHIPPED | OUTPATIENT
Start: 2023-02-21

## 2023-02-21 NOTE — TELEPHONE ENCOUNTER
Rony Tesfaye is requesting a refill on the following medication(s):  Requested Prescriptions     Pending Prescriptions Disp Refills    pantoprazole (PROTONIX) 40 MG tablet [Pharmacy Med Name: PANTOPRAZOLE SOD DR 40 MG TAB] 90 tablet 3     Sig: take 1 tablet by mouth once daily       Last Visit Date (If Applicable):  0/43/7564    Next Visit Date:    Visit date not found

## 2023-03-20 ENCOUNTER — TELEPHONE (OUTPATIENT)
Dept: FAMILY MEDICINE CLINIC | Age: 68
End: 2023-03-20

## 2023-03-20 DIAGNOSIS — F33.42 RECURRENT MAJOR DEPRESSIVE DISORDER, IN FULL REMISSION (HCC): ICD-10-CM

## 2023-03-20 NOTE — TELEPHONE ENCOUNTER
Rhys Vivar is requesting a refill on the following medication(s):  Requested Prescriptions     Pending Prescriptions Disp Refills    escitalopram (LEXAPRO) 5 MG tablet 90 tablet 1     Sig: Take 1 tablet by mouth daily       Last Visit Date (If Applicable):  6/11/5327    Next Visit Date:    Visit date not found

## 2023-03-21 RX ORDER — ESCITALOPRAM OXALATE 5 MG/1
5 TABLET ORAL DAILY
Qty: 90 TABLET | Refills: 1 | Status: SHIPPED | OUTPATIENT
Start: 2023-03-21

## 2023-09-03 DIAGNOSIS — F33.42 RECURRENT MAJOR DEPRESSIVE DISORDER, IN FULL REMISSION (HCC): ICD-10-CM

## 2023-09-05 RX ORDER — ESCITALOPRAM OXALATE 5 MG/1
TABLET ORAL
Qty: 90 TABLET | Refills: 3 | Status: SHIPPED | OUTPATIENT
Start: 2023-09-05

## 2023-09-05 NOTE — TELEPHONE ENCOUNTER
Estephanie Koch is requesting a refill on the following medication(s):  Requested Prescriptions     Pending Prescriptions Disp Refills    escitalopram (LEXAPRO) 5 MG tablet [Pharmacy Med Name: ESCITALOPRAM 5 MG TABLET] 90 tablet 3     Sig: take 1 tablet by mouth once daily       Last Visit Date (If Applicable):  7/20/5849    Next Visit Date:    9/8/2023

## 2023-09-08 ENCOUNTER — OFFICE VISIT (OUTPATIENT)
Dept: FAMILY MEDICINE CLINIC | Age: 68
End: 2023-09-08
Payer: COMMERCIAL

## 2023-09-08 VITALS
HEIGHT: 69 IN | SYSTOLIC BLOOD PRESSURE: 118 MMHG | HEART RATE: 60 BPM | OXYGEN SATURATION: 97 % | WEIGHT: 186 LBS | BODY MASS INDEX: 27.55 KG/M2 | DIASTOLIC BLOOD PRESSURE: 62 MMHG

## 2023-09-08 DIAGNOSIS — F33.42 RECURRENT MAJOR DEPRESSIVE DISORDER, IN FULL REMISSION (HCC): ICD-10-CM

## 2023-09-08 DIAGNOSIS — Z12.31 VISIT FOR SCREENING MAMMOGRAM: ICD-10-CM

## 2023-09-08 DIAGNOSIS — I42.8 NON-ISCHEMIC CARDIOMYOPATHY (HCC): ICD-10-CM

## 2023-09-08 DIAGNOSIS — E78.2 MIXED HYPERLIPIDEMIA: ICD-10-CM

## 2023-09-08 DIAGNOSIS — I10 ESSENTIAL HYPERTENSION: ICD-10-CM

## 2023-09-08 DIAGNOSIS — Z51.81 MEDICATION MONITORING ENCOUNTER: ICD-10-CM

## 2023-09-08 DIAGNOSIS — R73.01 IFG (IMPAIRED FASTING GLUCOSE): Primary | ICD-10-CM

## 2023-09-08 DIAGNOSIS — R91.1 INCIDENTAL LUNG NODULE, GREATER THAN OR EQUAL TO 8MM: ICD-10-CM

## 2023-09-08 DIAGNOSIS — J43.1 PANLOBULAR EMPHYSEMA (HCC): ICD-10-CM

## 2023-09-08 DIAGNOSIS — Z83.49 FAMILY HISTORY OF THYROID DISEASE IN MOTHER: ICD-10-CM

## 2023-09-08 LAB
ALBUMIN/GLOBULIN RATIO: 1.5 G/DL
ALBUMIN: 4.4 G/DL (ref 3.5–5)
ALP BLD-CCNC: 78 UNITS/L (ref 38–126)
ALT SERPL-CCNC: 30 UNITS/L (ref 4–35)
ANION GAP SERPL CALCULATED.3IONS-SCNC: 8.9 MMOL/L (ref 4–12)
AST SERPL-CCNC: 28 UNITS/L (ref 14–36)
BILIRUB SERPL-MCNC: 0.5 MG/DL (ref 0.2–1.3)
BUN BLDV-MCNC: 20 MG/DL (ref 7–17)
CALCIUM SERPL-MCNC: 10.1 MG/DL (ref 8.4–10.2)
CHLORIDE BLD-SCNC: 104 MMOL/L (ref 98–120)
CHOLESTEROL/HDL RATIO: 4.47 RATIO (ref 0–4.5)
CHOLESTEROL: 192 MG/DL (ref 50–200)
CO2: 27 MMOL/L (ref 22–31)
CREAT SERPL-MCNC: 0.8 MG/DL (ref 0.5–1)
GFR CALCULATED: > 60
GLOBULIN: 3 G/DL
GLUCOSE: 99 MG/DL (ref 65–105)
HBA1C MFR BLD: 6.3 %
HDLC SERPL-MCNC: 43 MG/DL (ref 36–68)
LDL CHOLESTEROL CALCULATED: 119.2 MG/DL (ref 0–160)
POTASSIUM SERPL-SCNC: 4.8 MMOL/L (ref 3.6–5)
SODIUM BLD-SCNC: 140 MMOL/L (ref 135–145)
TOTAL PROTEIN, SERUM: 7.4 G/DL (ref 6.3–8.2)
TRIGL SERPL-MCNC: 149 MG/DL (ref 10–250)
TSH SERPL DL<=0.05 MIU/L-ACNC: 1.38 MIU/ML (ref 0.49–4.67)
VITAMIN B-12: > 1000 PG/ML (ref 239–931)
VLDLC SERPL CALC-MCNC: 30 MG/DL (ref 0–50)

## 2023-09-08 PROCEDURE — 99214 OFFICE O/P EST MOD 30 MIN: CPT | Performed by: FAMILY MEDICINE

## 2023-09-08 PROCEDURE — 3074F SYST BP LT 130 MM HG: CPT | Performed by: FAMILY MEDICINE

## 2023-09-08 PROCEDURE — 83036 HEMOGLOBIN GLYCOSYLATED A1C: CPT | Performed by: FAMILY MEDICINE

## 2023-09-08 PROCEDURE — 1123F ACP DISCUSS/DSCN MKR DOCD: CPT | Performed by: FAMILY MEDICINE

## 2023-09-08 PROCEDURE — 3078F DIAST BP <80 MM HG: CPT | Performed by: FAMILY MEDICINE

## 2023-09-08 RX ORDER — AMLODIPINE BESYLATE 2.5 MG/1
2.5 TABLET ORAL DAILY
Qty: 90 TABLET | Refills: 0 | Status: SHIPPED | OUTPATIENT
Start: 2023-09-08 | End: 2023-09-11

## 2023-09-08 RX ORDER — ATORVASTATIN CALCIUM 20 MG/1
20 TABLET, FILM COATED ORAL DAILY
Qty: 90 TABLET | Refills: 3 | Status: SHIPPED | OUTPATIENT
Start: 2023-09-08

## 2023-09-08 SDOH — ECONOMIC STABILITY: FOOD INSECURITY: WITHIN THE PAST 12 MONTHS, THE FOOD YOU BOUGHT JUST DIDN'T LAST AND YOU DIDN'T HAVE MONEY TO GET MORE.: NEVER TRUE

## 2023-09-08 SDOH — ECONOMIC STABILITY: INCOME INSECURITY: HOW HARD IS IT FOR YOU TO PAY FOR THE VERY BASICS LIKE FOOD, HOUSING, MEDICAL CARE, AND HEATING?: NOT HARD AT ALL

## 2023-09-08 SDOH — ECONOMIC STABILITY: FOOD INSECURITY: WITHIN THE PAST 12 MONTHS, YOU WORRIED THAT YOUR FOOD WOULD RUN OUT BEFORE YOU GOT MONEY TO BUY MORE.: NEVER TRUE

## 2023-09-08 SDOH — ECONOMIC STABILITY: HOUSING INSECURITY
IN THE LAST 12 MONTHS, WAS THERE A TIME WHEN YOU DID NOT HAVE A STEADY PLACE TO SLEEP OR SLEPT IN A SHELTER (INCLUDING NOW)?: NO

## 2023-09-08 ASSESSMENT — PATIENT HEALTH QUESTIONNAIRE - PHQ9
1. LITTLE INTEREST OR PLEASURE IN DOING THINGS: 0
8. MOVING OR SPEAKING SO SLOWLY THAT OTHER PEOPLE COULD HAVE NOTICED. OR THE OPPOSITE, BEING SO FIGETY OR RESTLESS THAT YOU HAVE BEEN MOVING AROUND A LOT MORE THAN USUAL: 0
3. TROUBLE FALLING OR STAYING ASLEEP: 0
9. THOUGHTS THAT YOU WOULD BE BETTER OFF DEAD, OR OF HURTING YOURSELF: 0
SUM OF ALL RESPONSES TO PHQ QUESTIONS 1-9: 0
SUM OF ALL RESPONSES TO PHQ QUESTIONS 1-9: 0
2. FEELING DOWN, DEPRESSED OR HOPELESS: 0
4. FEELING TIRED OR HAVING LITTLE ENERGY: 0
7. TROUBLE CONCENTRATING ON THINGS, SUCH AS READING THE NEWSPAPER OR WATCHING TELEVISION: 0
6. FEELING BAD ABOUT YOURSELF - OR THAT YOU ARE A FAILURE OR HAVE LET YOURSELF OR YOUR FAMILY DOWN: 0
SUM OF ALL RESPONSES TO PHQ QUESTIONS 1-9: 0
5. POOR APPETITE OR OVEREATING: 0
SUM OF ALL RESPONSES TO PHQ9 QUESTIONS 1 & 2: 0
SUM OF ALL RESPONSES TO PHQ QUESTIONS 1-9: 0

## 2023-09-09 DIAGNOSIS — I10 ESSENTIAL HYPERTENSION: ICD-10-CM

## 2023-09-11 RX ORDER — AMLODIPINE BESYLATE 2.5 MG/1
2.5 TABLET ORAL DAILY
Qty: 90 TABLET | Refills: 3 | Status: SHIPPED | OUTPATIENT
Start: 2023-09-11

## 2023-09-11 NOTE — TELEPHONE ENCOUNTER
Ginger Hogue is requesting a refill on the following medication(s):  Requested Prescriptions     Pending Prescriptions Disp Refills    amLODIPine (NORVASC) 2.5 MG tablet [Pharmacy Med Name: AMLODIPINE BESYLATE 2.5 MG TAB] 90 tablet 0     Sig: take 1 tablet by mouth once daily       Last Visit Date (If Applicable):  3/0/8700    Next Visit Date:    9/13/2024

## 2024-03-02 DIAGNOSIS — K21.9 GASTROESOPHAGEAL REFLUX DISEASE WITHOUT ESOPHAGITIS: ICD-10-CM

## 2024-03-04 RX ORDER — PANTOPRAZOLE SODIUM 40 MG/1
TABLET, DELAYED RELEASE ORAL
Qty: 90 TABLET | Refills: 3 | Status: SHIPPED | OUTPATIENT
Start: 2024-03-04

## 2024-03-04 NOTE — TELEPHONE ENCOUNTER
Susu Rivera is requesting a refill on the following medication(s):  Requested Prescriptions     Pending Prescriptions Disp Refills    pantoprazole (PROTONIX) 40 MG tablet [Pharmacy Med Name: PANTOPRAZOLE SOD DR 40 MG TAB] 90 tablet 3     Sig: take 1 tablet by mouth once daily       Last Visit Date (If Applicable):  9/8/2023    Next Visit Date:    9/13/2024

## 2024-05-09 DIAGNOSIS — E78.2 MIXED HYPERLIPIDEMIA: ICD-10-CM

## 2024-05-09 DIAGNOSIS — F33.42 RECURRENT MAJOR DEPRESSIVE DISORDER, IN FULL REMISSION (HCC): ICD-10-CM

## 2024-05-09 NOTE — TELEPHONE ENCOUNTER
Susu Rivera is requesting a refill on the following medication(s):  Requested Prescriptions     Pending Prescriptions Disp Refills    atorvastatin (LIPITOR) 20 MG tablet 90 tablet 3     Sig: Take 1 tablet by mouth daily    escitalopram (LEXAPRO) 5 MG tablet 90 tablet 3     Sig: Take 1 tablet by mouth daily       Last Visit Date (If Applicable):  9/8/2023    Next Visit Date:    9/13/2024

## 2024-05-10 RX ORDER — ATORVASTATIN CALCIUM 20 MG/1
20 TABLET, FILM COATED ORAL DAILY
Qty: 90 TABLET | Refills: 3 | Status: SHIPPED | OUTPATIENT
Start: 2024-05-10

## 2024-05-10 RX ORDER — ESCITALOPRAM OXALATE 5 MG/1
5 TABLET ORAL DAILY
Qty: 90 TABLET | Refills: 3 | Status: SHIPPED | OUTPATIENT
Start: 2024-05-10

## 2024-09-13 ENCOUNTER — OFFICE VISIT (OUTPATIENT)
Dept: FAMILY MEDICINE CLINIC | Age: 69
End: 2024-09-13

## 2024-09-13 VITALS
HEART RATE: 58 BPM | WEIGHT: 190 LBS | OXYGEN SATURATION: 97 % | DIASTOLIC BLOOD PRESSURE: 78 MMHG | BODY MASS INDEX: 28.14 KG/M2 | SYSTOLIC BLOOD PRESSURE: 126 MMHG | HEIGHT: 69 IN

## 2024-09-13 DIAGNOSIS — Z23 NEEDS FLU SHOT: ICD-10-CM

## 2024-09-13 DIAGNOSIS — F33.42 RECURRENT MAJOR DEPRESSIVE DISORDER, IN FULL REMISSION (HCC): ICD-10-CM

## 2024-09-13 DIAGNOSIS — J43.1 PANLOBULAR EMPHYSEMA (HCC): ICD-10-CM

## 2024-09-13 DIAGNOSIS — Z13.220 ENCOUNTER FOR LIPID SCREENING FOR CARDIOVASCULAR DISEASE: ICD-10-CM

## 2024-09-13 DIAGNOSIS — Z51.81 MEDICATION MONITORING ENCOUNTER: ICD-10-CM

## 2024-09-13 DIAGNOSIS — Z12.31 ENCOUNTER FOR SCREENING MAMMOGRAM FOR BREAST CANCER: ICD-10-CM

## 2024-09-13 DIAGNOSIS — R73.01 IFG (IMPAIRED FASTING GLUCOSE): Primary | ICD-10-CM

## 2024-09-13 DIAGNOSIS — Z83.49 FAMILY HISTORY OF THYROID DISEASE: ICD-10-CM

## 2024-09-13 DIAGNOSIS — Z13.6 ENCOUNTER FOR LIPID SCREENING FOR CARDIOVASCULAR DISEASE: ICD-10-CM

## 2024-09-13 DIAGNOSIS — I65.21 CAROTID ARTERY CALCIFICATION, RIGHT: ICD-10-CM

## 2024-09-13 DIAGNOSIS — I42.8 NON-ISCHEMIC CARDIOMYOPATHY (HCC): ICD-10-CM

## 2024-09-13 LAB
ALBUMIN/GLOBULIN RATIO: 1.6 G/DL
ALBUMIN: 4.5 G/DL (ref 3.5–5)
ALP BLD-CCNC: 89 UNITS/L (ref 38–126)
ALT SERPL-CCNC: 30 UNITS/L (ref 4–35)
ANION GAP SERPL CALCULATED.3IONS-SCNC: 8.7 MMOL/L (ref 3–11)
AST SERPL-CCNC: 27 UNITS/L (ref 14–36)
BILIRUB SERPL-MCNC: 0.4 MG/DL (ref 0.2–1.3)
BUN BLDV-MCNC: 23 MG/DL (ref 7–17)
CALCIUM SERPL-MCNC: 9.8 MG/DL (ref 8.4–10.2)
CHLORIDE BLD-SCNC: 104 MMOL/L (ref 98–120)
CHOLESTEROL, TOTAL: 217 MG/DL (ref 50–200)
CHOLESTEROL/HDL RATIO: 5.29 RATIO (ref 0–4.5)
CO2: 25 MMOL/L (ref 22–31)
CREAT SERPL-MCNC: 0.9 MG/DL (ref 0.5–1)
GFR, ESTIMATED: > 60
GLOBULIN: 2.9 G/DL
GLUCOSE: 106 MG/DL (ref 65–105)
HBA1C MFR BLD: 6.1 %
HDLC SERPL-MCNC: 41 MG/DL (ref 36–68)
LDL CHOLESTEROL: 129.8 MG/DL (ref 0–160)
MAGNESIUM: 2.1 MG/DL (ref 1.6–2.3)
POTASSIUM SERPL-SCNC: 4.4 MMOL/L (ref 3.6–5)
SODIUM BLD-SCNC: 138 MMOL/L (ref 135–145)
TOTAL PROTEIN: 7.4 G/DL (ref 6.3–8.2)
TRIGL SERPL-MCNC: 231 MG/DL (ref 10–250)
TSH REFLEX FT4: 3.68 MIU/ML (ref 0.49–4.67)
VITAMIN B-12: > 1000 PG/ML (ref 239–931)
VLDLC SERPL CALC-MCNC: 46 MG/DL (ref 0–50)

## 2024-09-13 RX ORDER — ESCITALOPRAM OXALATE 10 MG/1
10 TABLET ORAL DAILY
Qty: 90 TABLET | Refills: 3 | Status: SHIPPED | OUTPATIENT
Start: 2024-09-13

## 2024-09-13 RX ORDER — ALBUTEROL SULFATE 90 UG/1
2 AEROSOL, METERED RESPIRATORY (INHALATION) EVERY 6 HOURS PRN
Qty: 18 G | Refills: 5 | Status: SHIPPED | OUTPATIENT
Start: 2024-09-13

## 2024-09-13 SDOH — ECONOMIC STABILITY: INCOME INSECURITY: HOW HARD IS IT FOR YOU TO PAY FOR THE VERY BASICS LIKE FOOD, HOUSING, MEDICAL CARE, AND HEATING?: NOT HARD AT ALL

## 2024-09-13 SDOH — ECONOMIC STABILITY: FOOD INSECURITY: WITHIN THE PAST 12 MONTHS, THE FOOD YOU BOUGHT JUST DIDN'T LAST AND YOU DIDN'T HAVE MONEY TO GET MORE.: NEVER TRUE

## 2024-09-13 SDOH — ECONOMIC STABILITY: FOOD INSECURITY: WITHIN THE PAST 12 MONTHS, YOU WORRIED THAT YOUR FOOD WOULD RUN OUT BEFORE YOU GOT MONEY TO BUY MORE.: NEVER TRUE

## 2024-09-13 ASSESSMENT — PATIENT HEALTH QUESTIONNAIRE - PHQ9
SUM OF ALL RESPONSES TO PHQ QUESTIONS 1-9: 6
9. THOUGHTS THAT YOU WOULD BE BETTER OFF DEAD, OR OF HURTING YOURSELF: NOT AT ALL
SUM OF ALL RESPONSES TO PHQ QUESTIONS 1-9: 6
2. FEELING DOWN, DEPRESSED OR HOPELESS: NOT AT ALL
7. TROUBLE CONCENTRATING ON THINGS, SUCH AS READING THE NEWSPAPER OR WATCHING TELEVISION: NOT AT ALL
5. POOR APPETITE OR OVEREATING: NOT AT ALL
4. FEELING TIRED OR HAVING LITTLE ENERGY: NEARLY EVERY DAY
SUM OF ALL RESPONSES TO PHQ9 QUESTIONS 1 & 2: 3
8. MOVING OR SPEAKING SO SLOWLY THAT OTHER PEOPLE COULD HAVE NOTICED. OR THE OPPOSITE, BEING SO FIGETY OR RESTLESS THAT YOU HAVE BEEN MOVING AROUND A LOT MORE THAN USUAL: NOT AT ALL
3. TROUBLE FALLING OR STAYING ASLEEP: NOT AT ALL
6. FEELING BAD ABOUT YOURSELF - OR THAT YOU ARE A FAILURE OR HAVE LET YOURSELF OR YOUR FAMILY DOWN: NOT AT ALL
SUM OF ALL RESPONSES TO PHQ QUESTIONS 1-9: 6
SUM OF ALL RESPONSES TO PHQ QUESTIONS 1-9: 6
1. LITTLE INTEREST OR PLEASURE IN DOING THINGS: NEARLY EVERY DAY

## 2024-10-14 ENCOUNTER — OFFICE VISIT (OUTPATIENT)
Dept: FAMILY MEDICINE CLINIC | Age: 69
End: 2024-10-14

## 2024-10-14 VITALS
HEART RATE: 59 BPM | DIASTOLIC BLOOD PRESSURE: 80 MMHG | BODY MASS INDEX: 27.47 KG/M2 | SYSTOLIC BLOOD PRESSURE: 130 MMHG | OXYGEN SATURATION: 96 % | WEIGHT: 186 LBS

## 2024-10-14 DIAGNOSIS — A09 INFECTIOUS GASTROENTERITIS: Primary | ICD-10-CM

## 2024-10-14 DIAGNOSIS — I10 ESSENTIAL HYPERTENSION: ICD-10-CM

## 2024-10-14 ASSESSMENT — ENCOUNTER SYMPTOMS
RECTAL PAIN: 0
VOMITING: 0
ABDOMINAL DISTENTION: 0
NAUSEA: 0
DIARRHEA: 1
ANAL BLEEDING: 0
BLOOD IN STOOL: 0
SHORTNESS OF BREATH: 0
ABDOMINAL PAIN: 0
CONSTIPATION: 0
ALLERGIC/IMMUNOLOGIC NEGATIVE: 1

## 2024-10-14 NOTE — PROGRESS NOTES
bleeding, blood in stool, constipation, nausea, rectal pain and vomiting.   Endocrine: Negative.    Genitourinary: Negative.    Musculoskeletal: Negative.    Skin: Negative.    Allergic/Immunologic: Negative.    Neurological: Negative.    Hematological: Negative.    Psychiatric/Behavioral:  Negative for dysphoric mood.            Objective:     There were no vitals taken for this visit.    Physical Exam  Vitals and nursing note reviewed.   Constitutional:       Appearance: Normal appearance. She is normal weight.   Eyes:      Conjunctiva/sclera: Conjunctivae normal.      Pupils: Pupils are equal, round, and reactive to light.   Cardiovascular:      Rate and Rhythm: Normal rate and regular rhythm.      Pulses: Normal pulses.      Heart sounds: Normal heart sounds.   Pulmonary:      Effort: Pulmonary effort is normal.      Breath sounds: Normal breath sounds.   Abdominal:      General: Bowel sounds are normal.      Palpations: Abdomen is soft. There is no mass.      Tenderness: There is no abdominal tenderness. There is no right CVA tenderness or left CVA tenderness.   Musculoskeletal:      Cervical back: Neck supple.      Right lower leg: No edema.      Left lower leg: No edema.   Neurological:      Mental Status: She is alert.   Psychiatric:         Mood and Affect: Mood normal.         Behavior: Behavior normal.         Thought Content: Thought content normal.         Multiple labs and other testing may have been ordered which may not be completely evident from the above note due to system interface incompatibilities.     Patient given educational materials - see patientinstructions.  Discussed use, benefit, and side effects of prescribed medications.  All patient questions answered.  Pt voiced understanding. Reviewed health maintenance.  Instructed to continue current medications, diet andexercise.  Patient agreed with treatment plan. Follow up as directed.     (Please note that portions of this note were

## 2024-10-22 DIAGNOSIS — I10 ESSENTIAL HYPERTENSION: ICD-10-CM

## 2024-10-22 DIAGNOSIS — K21.9 GASTROESOPHAGEAL REFLUX DISEASE WITHOUT ESOPHAGITIS: ICD-10-CM

## 2024-10-22 NOTE — TELEPHONE ENCOUNTER
Susu Rivera is requesting a refill on the following medication(s):  Requested Prescriptions     Pending Prescriptions Disp Refills    amLODIPine (NORVASC) 2.5 MG tablet 90 tablet 3     Sig: Take 1 tablet by mouth daily    pantoprazole (PROTONIX) 40 MG tablet 90 tablet 3     Sig: take 1 tablet by mouth once daily       Last Visit Date (If Applicable):  10/14/2024    Next Visit Date:    Visit date not found

## 2024-10-22 NOTE — TELEPHONE ENCOUNTER
Susu Rivera is requesting a refill on the following medication(s):  Requested Prescriptions     Pending Prescriptions Disp Refills    amLODIPine (NORVASC) 2.5 MG tablet 90 tablet 3     Sig: Take 1 tablet by mouth daily       Last Visit Date (If Applicable):  10/14/2024    Next Visit Date:    9/15/2025

## 2024-10-23 RX ORDER — PANTOPRAZOLE SODIUM 40 MG/1
TABLET, DELAYED RELEASE ORAL
Qty: 90 TABLET | Refills: 3 | Status: SHIPPED | OUTPATIENT
Start: 2024-10-23

## 2024-10-23 RX ORDER — AMLODIPINE BESYLATE 2.5 MG/1
2.5 TABLET ORAL DAILY
Qty: 90 TABLET | Refills: 3 | Status: SHIPPED | OUTPATIENT
Start: 2024-10-23

## 2025-05-14 ENCOUNTER — OFFICE VISIT (OUTPATIENT)
Dept: FAMILY MEDICINE CLINIC | Age: 70
End: 2025-05-14
Payer: COMMERCIAL

## 2025-05-14 VITALS
WEIGHT: 186 LBS | DIASTOLIC BLOOD PRESSURE: 66 MMHG | SYSTOLIC BLOOD PRESSURE: 128 MMHG | BODY MASS INDEX: 27.47 KG/M2 | HEART RATE: 62 BPM | OXYGEN SATURATION: 97 %

## 2025-05-14 DIAGNOSIS — Z12.31 ENCOUNTER FOR SCREENING MAMMOGRAM FOR BREAST CANCER: ICD-10-CM

## 2025-05-14 DIAGNOSIS — K57.92 DIVERTICULITIS: Primary | ICD-10-CM

## 2025-05-14 DIAGNOSIS — M72.2 PLANTAR FASCIITIS OF RIGHT FOOT: ICD-10-CM

## 2025-05-14 PROCEDURE — 1159F MED LIST DOCD IN RCRD: CPT | Performed by: FAMILY MEDICINE

## 2025-05-14 PROCEDURE — 1123F ACP DISCUSS/DSCN MKR DOCD: CPT | Performed by: FAMILY MEDICINE

## 2025-05-14 PROCEDURE — 3074F SYST BP LT 130 MM HG: CPT | Performed by: FAMILY MEDICINE

## 2025-05-14 PROCEDURE — 99214 OFFICE O/P EST MOD 30 MIN: CPT | Performed by: FAMILY MEDICINE

## 2025-05-14 PROCEDURE — G2211 COMPLEX E/M VISIT ADD ON: HCPCS | Performed by: FAMILY MEDICINE

## 2025-05-14 PROCEDURE — 3078F DIAST BP <80 MM HG: CPT | Performed by: FAMILY MEDICINE

## 2025-05-14 RX ORDER — CIPROFLOXACIN 500 MG/1
500 TABLET, FILM COATED ORAL 2 TIMES DAILY
Qty: 14 TABLET | Refills: 0 | Status: SHIPPED | OUTPATIENT
Start: 2025-05-14 | End: 2025-05-21

## 2025-05-14 RX ORDER — METRONIDAZOLE 500 MG/1
500 TABLET ORAL 3 TIMES DAILY
Qty: 21 TABLET | Refills: 0 | Status: SHIPPED | OUTPATIENT
Start: 2025-05-14 | End: 2025-05-21

## 2025-05-14 SDOH — ECONOMIC STABILITY: FOOD INSECURITY: WITHIN THE PAST 12 MONTHS, THE FOOD YOU BOUGHT JUST DIDN'T LAST AND YOU DIDN'T HAVE MONEY TO GET MORE.: NEVER TRUE

## 2025-05-14 SDOH — ECONOMIC STABILITY: FOOD INSECURITY: WITHIN THE PAST 12 MONTHS, YOU WORRIED THAT YOUR FOOD WOULD RUN OUT BEFORE YOU GOT MONEY TO BUY MORE.: NEVER TRUE

## 2025-05-14 ASSESSMENT — PATIENT HEALTH QUESTIONNAIRE - PHQ9
SUM OF ALL RESPONSES TO PHQ QUESTIONS 1-9: 5
8. MOVING OR SPEAKING SO SLOWLY THAT OTHER PEOPLE COULD HAVE NOTICED. OR THE OPPOSITE, BEING SO FIGETY OR RESTLESS THAT YOU HAVE BEEN MOVING AROUND A LOT MORE THAN USUAL: NOT AT ALL
SUM OF ALL RESPONSES TO PHQ QUESTIONS 1-9: 5
9. THOUGHTS THAT YOU WOULD BE BETTER OFF DEAD, OR OF HURTING YOURSELF: NOT AT ALL
5. POOR APPETITE OR OVEREATING: NOT AT ALL
7. TROUBLE CONCENTRATING ON THINGS, SUCH AS READING THE NEWSPAPER OR WATCHING TELEVISION: NOT AT ALL
1. LITTLE INTEREST OR PLEASURE IN DOING THINGS: SEVERAL DAYS
6. FEELING BAD ABOUT YOURSELF - OR THAT YOU ARE A FAILURE OR HAVE LET YOURSELF OR YOUR FAMILY DOWN: NOT AT ALL
4. FEELING TIRED OR HAVING LITTLE ENERGY: NEARLY EVERY DAY
3. TROUBLE FALLING OR STAYING ASLEEP: SEVERAL DAYS
SUM OF ALL RESPONSES TO PHQ QUESTIONS 1-9: 5
2. FEELING DOWN, DEPRESSED OR HOPELESS: NOT AT ALL
SUM OF ALL RESPONSES TO PHQ QUESTIONS 1-9: 5

## 2025-05-14 NOTE — PROGRESS NOTES
[facility]  [address], [address]  [place] [address]  Dept: [phone]  Dept Fax:[phone]    Susu Rivera is a 70 y.o. female who presents today for her medical conditions/complaints as notedbelow.        Assessment/Plan:     Assessment & Plan  1. Diverticulitis.  - Symptoms suggest a possible case of diverticulitis, characterized by abdominal pain and dark stools. The dark stools are likely due to Pepto-Bismol intake.  - Physical examination revealed tenderness in the lower abdomen, particularly off to the side.  - The pathophysiology of diverticulitis was explained in detail to the patient. A low-dose regimen of milk of magnesia is recommended to facilitate bowel movements without inducing excessive cramping or gas. Dietary modifications include a temporary reduction in roughage intake for a few days, avoiding foods such as corn, peas, seeds, nuts, and raw vegetables like cauliflower and broccoli for 1 to 2 weeks.  - A prescription for Cipro and metronidazole has been provided, but she is advised to monitor her condition over the next 48 hours before initiating the antibiotic course. If her condition improves within this period, the antibiotics may not be necessary. However, if she continues to experience tenderness and soreness during bowel movements, a week-long course of antibiotics will be initiated. She is also advised to abstain from alcohol consumption while on these medications.    2. Plantar fasciitis.  - Symptoms are indicative of plantar fasciitis, with pain localized to the heel.  - Physical examination confirmed tenderness on the bottom of the heel.  - She is advised to perform stretching exercises for the foot and calf, both with the knee straight and bent. Additional exercises include heel raises, towel curls, and picking up dice, marbles, or stones. She is also advised to stretch her foot before standing up from a chair or getting out of bed.  -

## 2025-05-15 ENCOUNTER — TELEPHONE (OUTPATIENT)
Dept: FAMILY MEDICINE CLINIC | Age: 70
End: 2025-05-15

## 2025-05-15 NOTE — TELEPHONE ENCOUNTER
Patient called states the Miralax is not going to work for her.  She states it makes her vomit it back up. Please advise.

## 2025-05-15 NOTE — TELEPHONE ENCOUNTER
Miralax or milk of magnasia?  Dscussed milk of magnesia-- if this is also a problem then try sennakot 1-2 tablets as needed to get the bowels moving.

## 2025-05-27 ENCOUNTER — TELEPHONE (OUTPATIENT)
Dept: FAMILY MEDICINE CLINIC | Age: 70
End: 2025-05-27

## 2025-05-27 DIAGNOSIS — K57.92 DIVERTICULITIS: Primary | ICD-10-CM

## 2025-05-27 DIAGNOSIS — K21.9 GASTROESOPHAGEAL REFLUX DISEASE WITHOUT ESOPHAGITIS: ICD-10-CM

## 2025-05-27 DIAGNOSIS — E78.2 MIXED HYPERLIPIDEMIA: ICD-10-CM

## 2025-05-27 DIAGNOSIS — R10.32 ABDOMINAL PAIN, LLQ: ICD-10-CM

## 2025-05-27 DIAGNOSIS — F33.42 RECURRENT MAJOR DEPRESSIVE DISORDER, IN FULL REMISSION: ICD-10-CM

## 2025-05-27 DIAGNOSIS — I10 ESSENTIAL HYPERTENSION: ICD-10-CM

## 2025-05-27 RX ORDER — ATORVASTATIN CALCIUM 20 MG/1
20 TABLET, FILM COATED ORAL DAILY
Qty: 90 TABLET | Refills: 3 | Status: SHIPPED | OUTPATIENT
Start: 2025-05-27

## 2025-05-27 RX ORDER — AMLODIPINE BESYLATE 2.5 MG/1
2.5 TABLET ORAL DAILY
Qty: 90 TABLET | Refills: 3 | Status: SHIPPED | OUTPATIENT
Start: 2025-05-27

## 2025-05-27 RX ORDER — ESCITALOPRAM OXALATE 10 MG/1
10 TABLET ORAL DAILY
Qty: 90 TABLET | Refills: 3 | Status: SHIPPED | OUTPATIENT
Start: 2025-05-27

## 2025-05-27 RX ORDER — PANTOPRAZOLE SODIUM 40 MG/1
TABLET, DELAYED RELEASE ORAL
Qty: 90 TABLET | Refills: 3 | Status: SHIPPED | OUTPATIENT
Start: 2025-05-27

## 2025-05-27 NOTE — TELEPHONE ENCOUNTER
At her appointment clinically she had diverticulitis.  I advised clear liquid diet and get the bowels moving.  Also prescribed antibiotic course.  Did she take the antibiotics?  Are her bowels moving?  Yes the gas can be related to this.  Need more information?  Fevers? Chills? Is she having nausea or vomiting? Is she eating OK?

## 2025-05-27 NOTE — TELEPHONE ENCOUNTER
Susu is calling asking is it normal to have a huge amount of gas with what she has going on?  She is taking gas x the recommended amount but can she take more than that?  Also at night it really builds up and is very uncomfortable to sleep.  Should she get a CT scan done?

## 2025-05-28 LAB
CREAT SERPL-MCNC: 0.9 MG/DL (ref 0.5–1)
GFR, ESTIMATED: > 60

## 2025-05-28 NOTE — TELEPHONE ENCOUNTER
Labs and CT of the abd/pelvis ordered.  Please help her get this set up ASAP and then follow up appt.  Thanks

## 2025-05-28 NOTE — TELEPHONE ENCOUNTER
Yes She took the atb- completed the course.   Bowels are moving but not that often. She is not eating much - she is not hungry. BM is soft.   She is taking miralax. She is taking gas X.   When she eats she gets gas and it hurts.   No N/V  No fever or chills  She feels like she wants to pass gas but cannot - holds it in there. She cannot relieve the gas pressure.

## 2025-06-02 ENCOUNTER — TELEPHONE (OUTPATIENT)
Dept: FAMILY MEDICINE CLINIC | Age: 70
End: 2025-06-02

## 2025-06-03 NOTE — TELEPHONE ENCOUNTER
Care Transitions Initial Follow Up Call    Outreach made within 2 business days of discharge: Yes    Patient: Susu Rivera Patient : 1955   MRN: [id number]  Reason for Admission: Colonic Diverticular Abscess Abdominal Pain   Discharge Date:  25       Spoke with: Patient     Discharge department/facility: [facility]    TCM Interactive Patient Contact:  Was patient able to fill all prescriptions: Yes  Was patient instructed to bring all medications to the follow-up visit: Yes  Is patient taking all medications as directed in the discharge summary? Yes  Does patient understand their discharge instructions: Yes  Does patient have questions or concerns that need addressed prior to 7-14 day follow up office visit: no    Additional needs identified to be addressed with provider  No needs identified             Scheduled appointment with PCP within 7-14 days    Follow Up  Future Appointments   Date Time Provider Department Center   9/15/2025  9:00 AM Cielo Martini MD [facility]       Fidel Jha MA

## 2025-06-11 ENCOUNTER — OFFICE VISIT (OUTPATIENT)
Dept: FAMILY MEDICINE CLINIC | Age: 70
End: 2025-06-11

## 2025-06-11 VITALS
DIASTOLIC BLOOD PRESSURE: 72 MMHG | OXYGEN SATURATION: 99 % | BODY MASS INDEX: 25.55 KG/M2 | HEART RATE: 74 BPM | WEIGHT: 173 LBS | SYSTOLIC BLOOD PRESSURE: 124 MMHG

## 2025-06-11 DIAGNOSIS — Z09 HOSPITAL DISCHARGE FOLLOW-UP: ICD-10-CM

## 2025-06-11 DIAGNOSIS — G47.19 EXCESSIVE DAYTIME SLEEPINESS: ICD-10-CM

## 2025-06-11 DIAGNOSIS — K57.20 DIVERTICULITIS OF LARGE INTESTINE WITH ABSCESS WITHOUT BLEEDING: Primary | ICD-10-CM

## 2025-06-11 NOTE — PROGRESS NOTES
Post-Discharge Transitional Care Management Progress Note      Susu Rivera   YOB: 1955    Date of Office Visit:  6/11/2025  Date of Hospital Admission: 5/28/2025  Date of Hospital Discharge: 5/30/2025    Care management risk score Rising risk (score 2-5) and Complex Care (Scores >=6): No Risk Score On File     Non face to face  following discharge, date last encounter closed (first attempt may have been earlier): 06/02/2025 06/02/2025    Call initiated 2 business days of discharge: Yes    Assessment & Plan  1. Perforated bowel.  - She was started on antibiotics, which helped but were not sufficient due to the size of the mass.  - A drain tube was placed, and the area was surgically cleaned out. She was on IV antibiotics for 2 days in the hospital and then sent home with Cipro and metronidazole for 10 days.  - Reports no fevers or chills since returning home. Appetite is gradually returning to normal, and bowels are moving fine without any blood in the stool.  - Follow-up with Dr. Tyler in a week to discuss a colonoscopy, expected to be scheduled in about 6 weeks. Advised to avoid high-fiber foods until abdominal discomfort subsides and then gradually reintroduce them into her diet.    2. Suspected sleep apnea.  - Reports that her  noticed episodes of shallow breathing during sleep while on pain medications.  - Experiences daytime sleepiness and occasional snoring.  - A sleep study was discussed but she is currently not interested in pursuing it.  - Advised to monitor symptoms and consider a sleep study if her  continues to notice episodes of shallow breathing.    3. Medication management.  - Currently on full-strength aspirin due to history of colon polyps.  - Benefits of full-strength aspirin in reducing the recurrence of colon polyps were discussed, along with the slightly higher risk of bleeding.  - Will continue on full-strength aspirin as she has not had any problems with

## 2025-06-25 ENCOUNTER — RESULTS FOLLOW-UP (OUTPATIENT)
Dept: FAMILY MEDICINE CLINIC | Age: 70
End: 2025-06-25

## 2025-07-01 ENCOUNTER — TELEPHONE (OUTPATIENT)
Dept: FAMILY MEDICINE CLINIC | Age: 70
End: 2025-07-01

## 2025-07-01 NOTE — TELEPHONE ENCOUNTER
Watch for lightheadedness.  If not improving in another week deniz if she has many many watery stools, very foul smelling then may need to test for C diff with recent multiple antibiotic courses.

## 2025-07-01 NOTE — TELEPHONE ENCOUNTER
Patient called and states that for a week she has been having green stool. Patient states that no matter what she eats her stool is the same green. Patient denies any blood in her stool, abdominal pain, or back pain. Patient states that she is feeling well and has been drinking water.